# Patient Record
Sex: FEMALE | Race: WHITE | Employment: FULL TIME | URBAN - METROPOLITAN AREA
[De-identification: names, ages, dates, MRNs, and addresses within clinical notes are randomized per-mention and may not be internally consistent; named-entity substitution may affect disease eponyms.]

---

## 2017-03-13 ENCOUNTER — OFFICE VISIT (OUTPATIENT)
Dept: FAMILY MEDICINE CLINIC | Age: 42
End: 2017-03-13

## 2017-03-13 VITALS
SYSTOLIC BLOOD PRESSURE: 110 MMHG | OXYGEN SATURATION: 98 % | WEIGHT: 216.8 LBS | TEMPERATURE: 97.8 F | DIASTOLIC BLOOD PRESSURE: 70 MMHG | HEART RATE: 81 BPM | BODY MASS INDEX: 34.99 KG/M2

## 2017-03-13 DIAGNOSIS — J40 BRONCHITIS: Primary | ICD-10-CM

## 2017-03-13 DIAGNOSIS — N30.01 ACUTE CYSTITIS WITH HEMATURIA: ICD-10-CM

## 2017-03-13 LAB
BILIRUBIN, POC: ABNORMAL
BLOOD URINE, POC: ABNORMAL
CLARITY, POC: ABNORMAL
COLOR, POC: ABNORMAL
GLUCOSE URINE, POC: ABNORMAL
KETONES, POC: ABNORMAL
LEUKOCYTE EST, POC: ABNORMAL
NITRITE, POC: ABNORMAL
PH, POC: 5.5
PROTEIN, POC: ABNORMAL
SPECIFIC GRAVITY, POC: >=1.03
UROBILINOGEN, POC: 0.2

## 2017-03-13 PROCEDURE — 99213 OFFICE O/P EST LOW 20 MIN: CPT | Performed by: FAMILY MEDICINE

## 2017-03-13 PROCEDURE — 81003 URINALYSIS AUTO W/O SCOPE: CPT | Performed by: FAMILY MEDICINE

## 2017-03-13 RX ORDER — LEVOFLOXACIN 500 MG/1
500 TABLET, FILM COATED ORAL DAILY
Qty: 10 TABLET | Refills: 0 | Status: SHIPPED | OUTPATIENT
Start: 2017-03-13 | End: 2017-03-23

## 2017-08-10 ENCOUNTER — TELEPHONE (OUTPATIENT)
Dept: FAMILY MEDICINE CLINIC | Age: 42
End: 2017-08-10

## 2018-01-19 ENCOUNTER — OFFICE VISIT (OUTPATIENT)
Dept: FAMILY MEDICINE CLINIC | Age: 43
End: 2018-01-19

## 2018-01-19 VITALS
HEART RATE: 70 BPM | HEIGHT: 66 IN | DIASTOLIC BLOOD PRESSURE: 62 MMHG | TEMPERATURE: 98.1 F | OXYGEN SATURATION: 98 % | WEIGHT: 196.4 LBS | SYSTOLIC BLOOD PRESSURE: 100 MMHG | BODY MASS INDEX: 31.57 KG/M2

## 2018-01-19 DIAGNOSIS — Z12.31 SCREENING MAMMOGRAM, ENCOUNTER FOR: ICD-10-CM

## 2018-01-19 DIAGNOSIS — R53.83 FATIGUE, UNSPECIFIED TYPE: ICD-10-CM

## 2018-01-19 DIAGNOSIS — F31.61 BIPOLAR DISORDER, CURRENT EPISODE MIXED, MILD (HCC): Primary | ICD-10-CM

## 2018-01-19 DIAGNOSIS — J45.20 MILD INTERMITTENT ASTHMA WITHOUT COMPLICATION: ICD-10-CM

## 2018-01-19 LAB
ALT SERPL-CCNC: 20 U/L (ref 10–40)
ANION GAP SERPL CALCULATED.3IONS-SCNC: 13 MMOL/L (ref 3–16)
BASOPHILS ABSOLUTE: 0.1 K/UL (ref 0–0.2)
BASOPHILS RELATIVE PERCENT: 0.8 %
BUN BLDV-MCNC: 16 MG/DL (ref 7–20)
CALCIUM SERPL-MCNC: 9.4 MG/DL (ref 8.3–10.6)
CHLORIDE BLD-SCNC: 104 MMOL/L (ref 99–110)
CO2: 25 MMOL/L (ref 21–32)
CREAT SERPL-MCNC: 0.7 MG/DL (ref 0.6–1.1)
EOSINOPHILS ABSOLUTE: 0.1 K/UL (ref 0–0.6)
EOSINOPHILS RELATIVE PERCENT: 1.1 %
GFR AFRICAN AMERICAN: >60
GFR NON-AFRICAN AMERICAN: >60
GLUCOSE BLD-MCNC: 82 MG/DL (ref 70–99)
HCT VFR BLD CALC: 38.1 % (ref 36–48)
HEMOGLOBIN: 13 G/DL (ref 12–16)
LYMPHOCYTES ABSOLUTE: 1.7 K/UL (ref 1–5.1)
LYMPHOCYTES RELATIVE PERCENT: 26.5 %
MCH RBC QN AUTO: 29.9 PG (ref 26–34)
MCHC RBC AUTO-ENTMCNC: 34.2 G/DL (ref 31–36)
MCV RBC AUTO: 87.4 FL (ref 80–100)
MONOCYTES ABSOLUTE: 0.5 K/UL (ref 0–1.3)
MONOCYTES RELATIVE PERCENT: 7.6 %
NEUTROPHILS ABSOLUTE: 4 K/UL (ref 1.7–7.7)
NEUTROPHILS RELATIVE PERCENT: 64 %
PDW BLD-RTO: 13 % (ref 12.4–15.4)
PLATELET # BLD: 291 K/UL (ref 135–450)
PMV BLD AUTO: 8.7 FL (ref 5–10.5)
POTASSIUM SERPL-SCNC: 4.7 MMOL/L (ref 3.5–5.1)
RBC # BLD: 4.36 M/UL (ref 4–5.2)
SODIUM BLD-SCNC: 142 MMOL/L (ref 136–145)
TSH REFLEX: 1.13 UIU/ML (ref 0.27–4.2)
WBC # BLD: 6.3 K/UL (ref 4–11)

## 2018-01-19 PROCEDURE — 99214 OFFICE O/P EST MOD 30 MIN: CPT | Performed by: FAMILY MEDICINE

## 2018-01-19 RX ORDER — LITHIUM CARBONATE 300 MG/1
300 CAPSULE ORAL 2 TIMES DAILY WITH MEALS
Qty: 60 CAPSULE | Refills: 3 | Status: SHIPPED | OUTPATIENT
Start: 2018-01-19 | End: 2018-02-03 | Stop reason: DRUGHIGH

## 2018-01-19 RX ORDER — FLUTICASONE FUROATE AND VILANTEROL 100; 25 UG/1; UG/1
1 POWDER RESPIRATORY (INHALATION) DAILY
Qty: 3 EACH | Refills: 0 | COMMUNITY
Start: 2018-01-19 | End: 2019-09-24

## 2018-01-19 ASSESSMENT — PATIENT HEALTH QUESTIONNAIRE - PHQ9
SUM OF ALL RESPONSES TO PHQ9 QUESTIONS 1 & 2: 2
SUM OF ALL RESPONSES TO PHQ QUESTIONS 1-9: 2
1. LITTLE INTEREST OR PLEASURE IN DOING THINGS: 2
2. FEELING DOWN, DEPRESSED OR HOPELESS: 0

## 2018-02-02 ENCOUNTER — OFFICE VISIT (OUTPATIENT)
Dept: FAMILY MEDICINE CLINIC | Age: 43
End: 2018-02-02

## 2018-02-02 VITALS
OXYGEN SATURATION: 100 % | HEART RATE: 69 BPM | SYSTOLIC BLOOD PRESSURE: 106 MMHG | BODY MASS INDEX: 31.93 KG/M2 | DIASTOLIC BLOOD PRESSURE: 60 MMHG | TEMPERATURE: 98.2 F | WEIGHT: 197.8 LBS

## 2018-02-02 DIAGNOSIS — J45.20 MILD INTERMITTENT ASTHMA WITHOUT COMPLICATION: ICD-10-CM

## 2018-02-02 DIAGNOSIS — F31.61 BIPOLAR DISORDER, CURRENT EPISODE MIXED, MILD (HCC): Primary | ICD-10-CM

## 2018-02-02 DIAGNOSIS — Z79.899 MEDICATION MANAGEMENT: ICD-10-CM

## 2018-02-02 LAB
ANION GAP SERPL CALCULATED.3IONS-SCNC: 13 MMOL/L (ref 3–16)
BUN BLDV-MCNC: 18 MG/DL (ref 7–20)
CALCIUM SERPL-MCNC: 9.7 MG/DL (ref 8.3–10.6)
CHLORIDE BLD-SCNC: 103 MMOL/L (ref 99–110)
CO2: 27 MMOL/L (ref 21–32)
CREAT SERPL-MCNC: 0.8 MG/DL (ref 0.6–1.1)
GFR AFRICAN AMERICAN: >60
GFR NON-AFRICAN AMERICAN: >60
GLUCOSE BLD-MCNC: 66 MG/DL (ref 70–99)
LITHIUM DOSE AMOUNT: ABNORMAL
LITHIUM LEVEL: 0.5 MMOL/L (ref 0.6–1.2)
POTASSIUM SERPL-SCNC: 4.5 MMOL/L (ref 3.5–5.1)
SODIUM BLD-SCNC: 143 MMOL/L (ref 136–145)
TSH REFLEX: 1.54 UIU/ML (ref 0.27–4.2)

## 2018-02-02 PROCEDURE — 99213 OFFICE O/P EST LOW 20 MIN: CPT | Performed by: FAMILY MEDICINE

## 2018-02-02 NOTE — PROGRESS NOTES
Subjective:  Bimal Miller is here to discuss the following issues. She has bipolar disorder. We started lithium. She feels significantly improved. She would like to increase the dose if possible. Her mood continues to be somewhat unpredictable. No suicidal or homicidal thoughts. No tremor nausea headache or other side effects. She has asthma and was started her on breo. Her symptoms have improved and she has no current wheezing cough chest tightness or shortness of breath. She has several samples available. No discolored sputum or fevers no vomiting or diarrhea  History   Smoking Status    Never Smoker   Smokeless Tobacco    Never Used   Allergies:     Latex and Other    Objective:  /60   Pulse 69   Temp 98.2 °F (36.8 °C) (Oral)   Wt 197 lb 12.8 oz (89.7 kg)   SpO2 100%   BMI 31.93 kg/m²    No acute distress, heart regular rate and rhythm without murmur, lungs clear to auscultation easy effort, abdomen soft nondistended, no clubbing or cyanosis mood happy affect reactive no tremor    Assessment:  1. Bipolar disorder, current episode mixed, mild ([address]    2. Mild intermittent asthma without complication    3. Medication management            Plan:  Labs ordered and plan on increasing lithium if possible pending results  Continued new inhaler  She would like to try to avoid antidepressants because they caused weight gain in the past  Call with an update in 3 weeks and follow-up in 6 months due to financial concerns and poor insurance coverage   \"Healthy Family Handout\" provided  Avoid exposure to all tobacco products.   Read and consider all information provided by the pharmacy regarding prescribed medications before use  Careful medical compliance  Proper diet and weight management   Otherwise continue current treatment plan  Call or return if symptoms are not well controlled  Go to ED if severe/significant symptoms occur    All medical conditions for this patient are stable unless otherwise

## 2018-02-03 RX ORDER — LITHIUM CARBONATE 300 MG/1
300 CAPSULE ORAL
Qty: 90 CAPSULE | Refills: 1 | Status: SHIPPED | OUTPATIENT
Start: 2018-02-03 | End: 2018-03-07

## 2018-03-07 ENCOUNTER — OFFICE VISIT (OUTPATIENT)
Dept: FAMILY MEDICINE CLINIC | Age: 43
End: 2018-03-07

## 2018-03-07 VITALS
SYSTOLIC BLOOD PRESSURE: 103 MMHG | TEMPERATURE: 97.5 F | OXYGEN SATURATION: 100 % | BODY MASS INDEX: 32.62 KG/M2 | HEART RATE: 74 BPM | WEIGHT: 202.13 LBS | DIASTOLIC BLOOD PRESSURE: 70 MMHG

## 2018-03-07 DIAGNOSIS — R60.0 LOCALIZED EDEMA: ICD-10-CM

## 2018-03-07 DIAGNOSIS — N30.00 ACUTE CYSTITIS WITHOUT HEMATURIA: Primary | ICD-10-CM

## 2018-03-07 LAB
BILIRUBIN, POC: ABNORMAL
BLOOD URINE, POC: ABNORMAL
CLARITY, POC: ABNORMAL
COLOR, POC: ABNORMAL
GLUCOSE URINE, POC: ABNORMAL
KETONES, POC: ABNORMAL
LEUKOCYTE EST, POC: ABNORMAL
NITRITE, POC: POSITIVE
PH, POC: 7.5
PROTEIN, POC: ABNORMAL
SPECIFIC GRAVITY, POC: 1.02
UROBILINOGEN, POC: 0.2

## 2018-03-07 PROCEDURE — 81003 URINALYSIS AUTO W/O SCOPE: CPT | Performed by: FAMILY MEDICINE

## 2018-03-07 PROCEDURE — 99213 OFFICE O/P EST LOW 20 MIN: CPT | Performed by: FAMILY MEDICINE

## 2018-03-07 RX ORDER — NITROFURANTOIN 25; 75 MG/1; MG/1
100 CAPSULE ORAL 2 TIMES DAILY
Qty: 14 CAPSULE | Refills: 0 | Status: SHIPPED | OUTPATIENT
Start: 2018-03-07 | End: 2018-03-14

## 2018-03-07 RX ORDER — FUROSEMIDE 20 MG/1
20 TABLET ORAL DAILY
Qty: 20 TABLET | Refills: 0 | Status: SHIPPED | OUTPATIENT
Start: 2018-03-07 | End: 2018-10-26

## 2018-03-09 LAB
ORGANISM: ABNORMAL
URINE CULTURE, ROUTINE: ABNORMAL
URINE CULTURE, ROUTINE: ABNORMAL

## 2018-04-11 ENCOUNTER — TELEPHONE (OUTPATIENT)
Dept: FAMILY MEDICINE CLINIC | Age: 43
End: 2018-04-11

## 2018-04-16 ENCOUNTER — TELEPHONE (OUTPATIENT)
Dept: FAMILY MEDICINE CLINIC | Age: 43
End: 2018-04-16

## 2018-04-16 DIAGNOSIS — E07.9 THYROID LESION: Primary | ICD-10-CM

## 2018-04-18 ENCOUNTER — HOSPITAL ENCOUNTER (OUTPATIENT)
Dept: ULTRASOUND IMAGING | Age: 43
Discharge: HOME | End: 2018-04-18
Attending: FAMILY MEDICINE | Admitting: FAMILY MEDICINE

## 2018-04-18 DIAGNOSIS — E07.89 OTHER SPECIFIED DISORDERS OF THYROID: ICD-10-CM

## 2018-04-18 DIAGNOSIS — E07.9 THYROID LESION: ICD-10-CM

## 2018-04-25 ENCOUNTER — TELEPHONE (OUTPATIENT)
Dept: FAMILY MEDICINE CLINIC | Age: 43
End: 2018-04-25

## 2018-04-25 DIAGNOSIS — Z86.018 HISTORY OF PITUITARY ADENOMA: ICD-10-CM

## 2018-04-25 DIAGNOSIS — R53.83 FATIGUE, UNSPECIFIED TYPE: Primary | ICD-10-CM

## 2018-04-25 DIAGNOSIS — L65.9 HAIR LOSS: ICD-10-CM

## 2018-07-02 ENCOUNTER — OFFICE VISIT (OUTPATIENT)
Dept: ORTHOPEDIC SURGERY | Age: 43
End: 2018-07-02

## 2018-07-02 DIAGNOSIS — S52.551A OTHER CLOSED EXTRA-ARTICULAR FRACTURE OF DISTAL END OF RIGHT RADIUS, INITIAL ENCOUNTER: ICD-10-CM

## 2018-07-02 DIAGNOSIS — M25.531 RIGHT WRIST PAIN: Primary | ICD-10-CM

## 2018-07-02 PROBLEM — S52.501A CLOSED FRACTURE OF RIGHT DISTAL RADIUS: Status: ACTIVE | Noted: 2018-07-02

## 2018-07-02 PROCEDURE — 99203 OFFICE O/P NEW LOW 30 MIN: CPT | Performed by: ORTHOPAEDIC SURGERY

## 2018-07-02 NOTE — PROGRESS NOTES
fracture    Office Procedures:    Orders Placed This Encounter   Procedures    XR WRIST RIGHT (MIN 3 VIEWS)

## 2018-07-09 ENCOUNTER — OFFICE VISIT (OUTPATIENT)
Dept: ORTHOPEDIC SURGERY | Age: 43
End: 2018-07-09

## 2018-07-09 VITALS — BODY MASS INDEX: 32.6 KG/M2 | WEIGHT: 202 LBS

## 2018-07-09 DIAGNOSIS — S52.551S OTHER CLOSED EXTRA-ARTICULAR FRACTURE OF DISTAL END OF RIGHT RADIUS, SEQUELA: Primary | ICD-10-CM

## 2018-07-09 DIAGNOSIS — M79.641 RIGHT HAND PAIN: ICD-10-CM

## 2018-07-09 PROCEDURE — 99213 OFFICE O/P EST LOW 20 MIN: CPT | Performed by: ORTHOPAEDIC SURGERY

## 2018-07-09 PROCEDURE — 29075 APPL CST ELBW FNGR SHORT ARM: CPT | Performed by: ORTHOPAEDIC SURGERY

## 2018-07-30 ENCOUNTER — OFFICE VISIT (OUTPATIENT)
Dept: ORTHOPEDIC SURGERY | Age: 43
End: 2018-07-30

## 2018-07-30 DIAGNOSIS — S52.551D OTHER CLOSED EXTRA-ARTICULAR FRACTURE OF DISTAL END OF RIGHT RADIUS WITH ROUTINE HEALING, SUBSEQUENT ENCOUNTER: Primary | ICD-10-CM

## 2018-07-30 PROCEDURE — 99213 OFFICE O/P EST LOW 20 MIN: CPT | Performed by: ORTHOPAEDIC SURGERY

## 2018-07-30 PROCEDURE — L3908 WHO COCK-UP NONMOLDE PRE OTS: HCPCS | Performed by: ORTHOPAEDIC SURGERY

## 2018-07-30 NOTE — PROGRESS NOTES
Referral Priority:   Routine     Referral Type:   Eval and Treat     Referral Reason:   Specialty Services Required     Requested Specialty:   Occupational Therapy     Number of Visits Requested:   1    Elizabeth Francois Titan Wrist Short Brace     Patient was prescribed a Elizabeth Francois Titan Wrist Orthosis. The right wrist will require stabilization / immobilization from this semi-rigid / rigid orthosis to improve their function. The orthosis will assist in protecting the affected area, provide functional support and facilitate healing. The patient was educated and fit by a healthcare professional with expert knowledge and specialization in brace application while under the direct supervision of the treating physician. Verbal and written instructions for the use of and application of this item were provided. They were instructed to contact the office immediately should the brace result in increased pain, decreased sensation, increased swelling or worsening of the condition.

## 2018-07-31 ENCOUNTER — OFFICE VISIT (OUTPATIENT)
Dept: FAMILY MEDICINE CLINIC | Age: 43
End: 2018-07-31

## 2018-07-31 VITALS
DIASTOLIC BLOOD PRESSURE: 67 MMHG | WEIGHT: 208 LBS | TEMPERATURE: 98.5 F | HEART RATE: 67 BPM | SYSTOLIC BLOOD PRESSURE: 106 MMHG | OXYGEN SATURATION: 99 % | BODY MASS INDEX: 33.57 KG/M2

## 2018-07-31 DIAGNOSIS — N30.01 ACUTE CYSTITIS WITH HEMATURIA: Primary | ICD-10-CM

## 2018-07-31 DIAGNOSIS — R30.0 DYSURIA: ICD-10-CM

## 2018-07-31 LAB
BILIRUBIN, POC: ABNORMAL
BLOOD URINE, POC: ABNORMAL
CLARITY, POC: ABNORMAL
COLOR, POC: ABNORMAL
GLUCOSE URINE, POC: ABNORMAL
KETONES, POC: ABNORMAL
LEUKOCYTE EST, POC: ABNORMAL
NITRITE, POC: ABNORMAL
PH, POC: 7
PROTEIN, POC: ABNORMAL
SPECIFIC GRAVITY, POC: 1.02
UROBILINOGEN, POC: 0.2

## 2018-07-31 PROCEDURE — 99213 OFFICE O/P EST LOW 20 MIN: CPT | Performed by: NURSE PRACTITIONER

## 2018-07-31 PROCEDURE — 81003 URINALYSIS AUTO W/O SCOPE: CPT | Performed by: NURSE PRACTITIONER

## 2018-07-31 RX ORDER — NITROFURANTOIN 25; 75 MG/1; MG/1
100 CAPSULE ORAL 2 TIMES DAILY
Qty: 14 CAPSULE | Refills: 0 | Status: SHIPPED | OUTPATIENT
Start: 2018-07-31 | End: 2018-08-07

## 2018-07-31 ASSESSMENT — ENCOUNTER SYMPTOMS
ABDOMINAL PAIN: 0
BACK PAIN: 1
NAUSEA: 1
SHORTNESS OF BREATH: 0
VOMITING: 0

## 2018-07-31 NOTE — PATIENT INSTRUCTIONS
products that have deodorants. · After going to the bathroom, wipe from front to back. When should you call for help? Call your doctor now or seek immediate medical care if:    · Symptoms such as fever, chills, nausea, or vomiting get worse or appear for the first time.     · You have new pain in your back just below your rib cage. This is called flank pain.     · There is new blood or pus in your urine.     · You have any problems with your antibiotic medicine.    Watch closely for changes in your health, and be sure to contact your doctor if:    · You are not getting better after taking an antibiotic for 2 days.     · Your symptoms go away but then come back. Where can you learn more? Go to [email or web address] and sign in to your [employer] account. Enter D192 in the [employer]arm box to learn more about \"Urinary Tract Infection in Women: Care Instructions. \"     If you do not have an account, please click on the \"Sign Up Now\" link. Current as of: May 12, 2017  Content Version: 11.6  © 4872-7645 [employer]. Care instructions adapted under license by [employer]. If you have questions about a medical condition or this instruction, always ask your healthcare professional. [place] [address] any warranty or liability for your use of this information.

## 2018-08-03 LAB
ORGANISM: ABNORMAL
ORGANISM: ABNORMAL
URINE CULTURE, ROUTINE: ABNORMAL

## 2018-08-09 ENCOUNTER — HOSPITAL ENCOUNTER (OUTPATIENT)
Dept: OCCUPATIONAL THERAPY | Age: 43
Setting detail: THERAPIES SERIES
Discharge: HOME | End: 2018-08-09
Payer: COMMERCIAL

## 2018-08-09 PROCEDURE — 97110 THERAPEUTIC EXERCISES: CPT | Performed by: OCCUPATIONAL THERAPIST

## 2018-08-09 PROCEDURE — 97018 PARAFFIN BATH THERAPY: CPT | Performed by: OCCUPATIONAL THERAPIST

## 2018-08-09 NOTE — FLOWSHEET NOTE
Objective Measures:        Digits: tips to DP[facility]   0cm IF-RF  0.5cm SF  2.5cm thumb       Wrist ext/flex            rd/ud   40/30 30/22      FA sup/pron   55/43 30/30      Modalities:        Fluidotherapy 12'       MHP + para  10'              Therapeutic Exercise, Activities, NMR:        AROM 10x2 each wrist ext/flex, rd/ud (hand towards ceiling to perform exercises in single plane - decreased pain)    10x2 FA rotation          Activities Page turning x 15 for FA supination                                 Therapeutic Exercise and NMR:  [x] (76277) Provided verbal/tactile cueing for activities related to strengthening, flexibility, endurance, ROM  for improvements in scapular, scapulothoracic and UE control with self care, reaching, carrying, lifting, house/yardwork, driving/computer work.    [] (42688) Provided verbal/tactile cueing for activities related to improving balance, coordination, kinesthetic sense, posture, motor skill, proprioception  to assist with  scapular, scapulothoracic and UE control with self care, reaching, carrying, lifting, house/yardwork, driving/computer work.   [] Comments:    Therapeutic Activities:    [x] (10473 or 21005) Provided verbal/tactile cueing for activities related to improving balance, coordination, kinesthetic sense, posture, motor skill, proprioception and motor activation to allow for proper function of scapular, scapulothoracic and UE control with self care, carrying, lifting, driving/computer work  [] Comments:    Home Exercise Program:    [x] (03799) Reviewed/Progressed HEP activities related to strengthening, flexibility, endurance, ROM of scapular, scapulothoracic and UE control with self care, reaching, carrying, lifting, house/yardwork, driving/computer work  [] (49422) Reviewed/Progressed HEP activities related to improving balance, coordination, kinesthetic sense, posture, motor skill, proprioception of scapular, scapulothoracic and UE control with self care, be achieved in 6  weeks, including patient directed goals to address identified performance deficits:  1) Pt to be independent in graded HEP progression with a good level of effort and compliance. 2) Pt to report a score of 30 % or less on the Quick DASH disability questionnaire for increased performance with carrying, moving, and handling objects. 3) Pt will demonstrate increased ROM to R wrist ext/flex >/= 50 degrees each, FA sup/pron >/= 60 degrees for improved independence with lifting and carrying work related tasks (inspecting glass). 4) Pt will demonstrate increased strength to R /pinch >/= 50% of L for improved independence with opening jars. 5) Pt will have a decrease in pain to 0-1/10 to facilitate improvement in return to recreational tasks (sewing, gardening). 6)    7)      Progression Towards Functional goals:  [] Patient is progressing as expected towards functional goals listed. [] Progression is slowed due to complexities listed. [] Progression has been slowed due to co-morbidities.   [x] Plan just implemented, too soon to assess goals progression  [] All goals are met  [] Other:     ASSESSMENT:  See eval    Treatment/Activity Tolerance:  [x] Patient tolerated treatment well [] Patient limited by fatique  [] Patient limited by pain  [] Patient limited by other medical complications  [] Other:     Prognosis: [x] Good [] Fair  [] Poor    Patient Requires Follow-up: [x] Yes  [] No    PLAN:   [x] Continue per plan of care [x] Alter current plan (see comments) Progress toward strengthening as able  [] Plan of care initiated [] Hold pending MD visit [] Discharge    Electronically signed by: Faisal Mccarthy OTR/WENDY, [address] [place] [address]

## 2018-08-13 ENCOUNTER — HOSPITAL ENCOUNTER (OUTPATIENT)
Dept: OCCUPATIONAL THERAPY | Age: 43
Setting detail: THERAPIES SERIES
Discharge: HOME | End: 2018-08-13
Payer: COMMERCIAL

## 2018-08-13 PROCEDURE — 97018 PARAFFIN BATH THERAPY: CPT | Performed by: OCCUPATIONAL THERAPIST

## 2018-08-13 PROCEDURE — 97110 THERAPEUTIC EXERCISES: CPT | Performed by: OCCUPATIONAL THERAPIST

## 2018-08-13 NOTE — FLOWSHEET NOTE
[address] [place], [address]                  Hand Therapy Daily Treatment Note  Date:  2018    Patient: Norm Champion   : 1975   MRN: [id number]  Referring Physician: Referring Practitioner: Davina Cowart MD       Medical Diagnosis Information:  Diagnosis: S52.551D (ICD-10-CM) - Other closed extra-articular fracture of distal end of right radius with routine healing    Treatment Diagnosis: R wrist stiffness - M25.631                                         Insurance information: OT Insurance Information: WC  Date of Injury: 18  Date of Surgery: NA     Visit # Insurance Allowable   3 12 visits approved thru 18     Date of Patient follow up with Physician: 18    G-Codes:  OT G-codes  Functional Assessment Tool Used: Quick DASH  Score: 55%  Functional Limitation: Carrying, moving and handling objects  Carrying, Moving and Handling Objects Current Status (): At least 40 percent but less than 60 percent impaired, limited or restricted  Carrying, Moving and Handling Objects Goal Status (): At least 20 percent but less than 40 percent impaired, limited or restricted    Progress Note: []  Yes  [x]  No  Next due by: Visit #10      Precautions/ Contra-indications: fx protocol  Latex Allergy:  []No      [x]Yes                 Pacemaker:  [x] No       [] Yes   **allergic to band-aid adhesives, latex, styrofoam, chapstick     Preferred Language for Healthcare:   [x]English       []other:    Pain level:  2/10     SUBJECTIVE: Splint wear at work only. HEP 2-3x/day. 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  Some symptoms like tightness remain, but are not a functional limitation    Background/Relevant Medical & Therapy History: fell onto R wrist on 18 while trying to prevent a shelf from falling at work; casted x 6 scapular, scapulothoracic and UE control with self care, reaching, carrying, lifting, house/yardwork, driving/computer work    [x] Comments: see sheet. Added composite wrist/digit rolling, gentle PROM to wrist ext as tolerated. Prayer stretch or tabletop stretch for extension    Manual Treatments:  PROM / STM / Oscillations-Mobs:  G-I, II, III, IV (PA's, Inf., Post.)  [x] (64901) Provided manual therapy to mobilize soft tissue/joints of cervical/CT, scapular GHJ and UE for the purpose of modulating pain, promoting relaxation,  increasing ROM, reducing/eliminating soft tissue swelling/inflammation/restriction, improving soft tissue extensibility and allowing for proper ROM for normal function with self care, reaching, carrying, lifting, house/yardwork, driving/computer work  [x] Comments: gentle AAROM, issued compression sleeve for edema control/wrist support    ADL Training:  []  (63343) Provided self-care/home management training related to activities of daily living and compensatory training, and/or use of adaptive equipment   [] Comments:      Splinting:  [] Fabrication of:   [] (91812) Orthotic/Prosthetic Management, subsequent encounter  [] (68175) Orthotic management and training (fitting and assessment)  [] Comments:     Charges:  Timed Code Treatment Minutes: 40   Total Treatment Minutes: 50     [] EVAL (LOW) 42944   [] OT Re-eval (56421)  [] EVAL (MOD) 35348   [] EVAL (HIGH) 72107       [x] Sin (56343) x  3   [] RMUOP(95681)  [] NMR (95440) x      [] Estim (attended) (26647)   [] Manual ([id number]) x       [] US (34134)  [] TA (57550) x      [x] Paraffin (77245)  [] ADL  (74226) x     [] Splint/L code:    [] Estim (unattended) (22 [id number])  [] Fluidotherapy (34908)  [] Other:    GOALS:  Short Term Goals: To be achieved in: 2 weeks  1. Independent in HEP and progression per patient tolerance, in order to prevent re-injury.    2. Patient will have a decrease in pain to facilitate improvement in movement, function, and

## 2018-08-15 ENCOUNTER — HOSPITAL ENCOUNTER (OUTPATIENT)
Dept: OCCUPATIONAL THERAPY | Age: 43
Setting detail: THERAPIES SERIES
Discharge: HOME | End: 2018-08-15
Payer: COMMERCIAL

## 2018-08-15 PROCEDURE — 97110 THERAPEUTIC EXERCISES: CPT | Performed by: OCCUPATIONAL THERAPIST

## 2018-08-15 PROCEDURE — 97018 PARAFFIN BATH THERAPY: CPT | Performed by: OCCUPATIONAL THERAPIST

## 2018-08-15 NOTE — FLOWSHEET NOTE
onto R wrist on 6/22/18 while trying to prevent a shelf from falling at work; casted x 6 weeks, then transitioned to wrist brace; return to MD on 8/27/18     RESTRICTIONS/PRECAUTIONS: fx protocol    OBJECTIVE:       Date: 8/7/18 8/9/18 8/13/18 8/15/18    Objective Measures: BRAYAN       Digits: tips to [facility]          Wrist ext/flex            rd/ud    30/22 40/30     FA sup/pron    30/30 50/35      #2   R21 L80 R33    Modalities:        Fluidotherapy        MHP + para  10' 10' 10'            Therapeutic Exercise, Activities, NMR:        AROM        Activities        Prayer stretch   x10 x10    Flexbar   Red x20 Red x20    Pilates ring   Pulse x20 Pulse x20    Dowel in putty   6' 6'      Therapeutic Exercise and NMR:  [x] (52115) Provided verbal/tactile cueing for activities related to strengthening, flexibility, endurance, ROM  for improvements in scapular, scapulothoracic and UE control with self care, reaching, carrying, lifting, house/yardwork, driving/computer work.    [] (75497) Provided verbal/tactile cueing for activities related to improving balance, coordination, kinesthetic sense, posture, motor skill, proprioception  to assist with  scapular, scapulothoracic and UE control with self care, reaching, carrying, lifting, house/yardwork, driving/computer work.   [] Comments:    Therapeutic Activities:    [x] (25921 or 09451) Provided verbal/tactile cueing for activities related to improving balance, coordination, kinesthetic sense, posture, motor skill, proprioception and motor activation to allow for proper function of scapular, scapulothoracic and UE control with self care, carrying, lifting, driving/computer work  [] Comments:    Home Exercise Program:    [x] (22539) Reviewed/Progressed HEP activities related to strengthening, flexibility, endurance, ROM of scapular, scapulothoracic and UE control with self care, reaching, carrying, lifting, house/yardwork, driving/computer work  [] (04107) decrease in pain to facilitate improvement in movement, function, and ADLs as indicated by Functional Deficits. Long Term Goals to be achieved in 6  weeks, including patient directed goals to address identified performance deficits:  1) Pt to be independent in graded HEP progression with a good level of effort and compliance. 2) Pt to report a score of 30 % or less on the Quick DASH disability questionnaire for increased performance with carrying, moving, and handling objects. 3) Pt will demonstrate increased ROM to R wrist ext/flex >/= 50 degrees each, FA sup/pron >/= 60 degrees for improved independence with lifting and carrying work related tasks (inspecting glass). 4) Pt will demonstrate increased strength to R /pinch >/= 50% of L for improved independence with opening jars. 5) Pt will have a decrease in pain to 0-1/10 to facilitate improvement in return to recreational tasks (sewing, gardening). Progression Towards Functional goals:  [x] Patient is progressing as expected towards functional goals listed. [] Progression is slowed due to complexities listed. [] Progression has been slowed due to co-morbidities. [x] Plan just implemented, too soon to assess goals progression  [] All goals are met  [] Other:     ASSESSMENT:  Good strength increase within the week.  Tolerated initial strengthening well    Treatment/Activity Tolerance:  [x] Patient tolerated treatment well [] Patient limited by fatique  [] Patient limited by pain  [] Patient limited by other medical complications  [] Other:     Prognosis: [x] Good [] Fair  [] Poor    Patient Requires Follow-up: [x] Yes  [] No    PLAN:   [x] Continue per plan of care [x] Alter current plan (see comments) Progress strengthening as able  [] Plan of care initiated [] Hold pending MD visit [] Discharge    Electronically signed by: Alfredo Vasquez OTR/L, [facility]

## 2018-08-20 ENCOUNTER — HOSPITAL ENCOUNTER (OUTPATIENT)
Dept: OCCUPATIONAL THERAPY | Age: 43
Setting detail: THERAPIES SERIES
Discharge: HOME | End: 2018-08-20
Payer: COMMERCIAL

## 2018-08-20 PROCEDURE — 97110 THERAPEUTIC EXERCISES: CPT | Performed by: OCCUPATIONAL THERAPIST

## 2018-08-20 PROCEDURE — 97018 PARAFFIN BATH THERAPY: CPT | Performed by: OCCUPATIONAL THERAPIST

## 2018-08-20 NOTE — FLOWSHEET NOTE
Therapy History: fell onto R wrist on 6/22/18 while trying to prevent a shelf from falling at work; casted x 6 weeks, then transitioned to wrist brace; return to MD on 8/27/18     RESTRICTIONS/PRECAUTIONS: fx protocol    OBJECTIVE:       Date: 8/7/18 8/9/18 8/13/18 8/15/18 8/20/18   Objective Measures: BRAYAN       Digits: tips to [facility]          Wrist ext/flex            rd/ud    30/22 40/30  52/35   FA sup/pron    30/30 50/35  60/45    #2   R21 L80 R33 R35   Modalities:        Fluidotherapy        MHP + para  10' 10' 10' 10'           Therapeutic Exercise, Activities, NMR:        AROM        Activities        Prayer stretch   x10 x10    Flexbar   Red x20 Red x20 Red x20 flex/ext   Pilates ring   Pulse x20 Pulse x20 Pulse x20   Powerweb     Grab and twist x20   Dowel in putty   6' 6' 7'     Therapeutic Exercise and NMR:  [x] (81633) Provided verbal/tactile cueing for activities related to strengthening, flexibility, endurance, ROM  for improvements in scapular, scapulothoracic and UE control with self care, reaching, carrying, lifting, house/yardwork, driving/computer work.    [] (01706) Provided verbal/tactile cueing for activities related to improving balance, coordination, kinesthetic sense, posture, motor skill, proprioception  to assist with  scapular, scapulothoracic and UE control with self care, reaching, carrying, lifting, house/yardwork, driving/computer work.   [] Comments:    Therapeutic Activities:    [x] (19652 or 38215) Provided verbal/tactile cueing for activities related to improving balance, coordination, kinesthetic sense, posture, motor skill, proprioception and motor activation to allow for proper function of scapular, scapulothoracic and UE control with self care, carrying, lifting, driving/computer work  [] Comments:    Home Exercise Program:    [x] (65325) Reviewed/Progressed HEP activities related to strengthening, flexibility, endurance, ROM of scapular, scapulothoracic and UE control with self care, reaching, carrying, lifting, house/yardwork, driving/computer work  [] (74148) Reviewed/Progressed HEP activities related to improving balance, coordination, kinesthetic sense, posture, motor skill, proprioception of scapular, scapulothoracic and UE control with self care, reaching, carrying, lifting, house/yardwork, driving/computer work    [x] Comments: see sheet. Added composite wrist/digit rolling, gentle PROM to wrist ext as tolerated. Prayer stretch or tabletop stretch for extension    Manual Treatments:  PROM / STM / Oscillations-Mobs:  G-I, II, III, IV (PA's, Inf., Post.)  [x] (82645) Provided manual therapy to mobilize soft tissue/joints of cervical/CT, scapular GHJ and UE for the purpose of modulating pain, promoting relaxation,  increasing ROM, reducing/eliminating soft tissue swelling/inflammation/restriction, improving soft tissue extensibility and allowing for proper ROM for normal function with self care, reaching, carrying, lifting, house/yardwork, driving/computer work  [] Comments:     ADL Training:  []  (02877) Provided self-care/home management training related to activities of daily living and compensatory training, and/or use of adaptive equipment   [] Comments:      Splinting:  [] Fabrication of:   [] (48312) Orthotic/Prosthetic Management, subsequent encounter  [] (65602) Orthotic management and training (fitting and assessment)  [] Comments:     Charges:  Timed Code Treatment Minutes: 40   Total Treatment Minutes: 50     [] EVAL (LOW) 47266   [] OT Re-eval (34127)  [] EVAL (MOD) 86609   [] EVAL (HIGH) 99826       [x] Sin (26921) x  3   [] YYGTC(67358)  [] NMR (46356) x      [] Estim (attended) (00936)   [] Manual ([id number]) x       [] US (96772)  [] TA (06376) x      [x] Paraffin (24466)  [] ADL  (96860) x     [] Splint/L code:    [] Estim (unattended) (22 159686)  [] Fluidotherapy (45532)  [] Other:    GOALS:  Short Term Goals: To be achieved in: 2 weeks  1.  Independent in HEP and

## 2018-08-22 ENCOUNTER — HOSPITAL ENCOUNTER (OUTPATIENT)
Dept: OCCUPATIONAL THERAPY | Age: 43
Setting detail: THERAPIES SERIES
Discharge: HOME | End: 2018-08-22
Payer: COMMERCIAL

## 2018-08-22 PROCEDURE — G8985 CARRY GOAL STATUS: HCPCS | Performed by: OCCUPATIONAL THERAPIST

## 2018-08-22 PROCEDURE — 97110 THERAPEUTIC EXERCISES: CPT | Performed by: OCCUPATIONAL THERAPIST

## 2018-08-22 PROCEDURE — G8984 CARRY CURRENT STATUS: HCPCS | Performed by: OCCUPATIONAL THERAPIST

## 2018-08-22 NOTE — FLOWSHEET NOTE
Therapy History: fell onto R wrist on 6/22/18 while trying to prevent a shelf from falling at work; casted x 6 weeks, then transitioned to wrist brace; return to MD on 8/27/18     RESTRICTIONS/PRECAUTIONS: fx protocol    OBJECTIVE:       Date: 8/7/18 8/9/18 8/13/18 8/15/18 8/20/18 8/22/18   Objective Measures: BRAYAN        Digits: tips to [facility]           Wrist ext/flex            rd/ud    30/22 40/30  52/35    FA sup/pron    30/30 50/35  60/45     #2   R21 L80 R33 R35 R37   Modalities:         Fluidotherapy         MHP + para  10' 10' 10' 10'             Therapeutic Exercise, Activities, NMR:         AROM         Activities         Prayer stretch   x10 x10  x10   Flexbar   Red x20 Red x20 Red x20 flex/ext Red x15 flex/ext   Pilates ring   Pulse x20 Pulse x20 Pulse x20 Pulse x20   Foam piece       Gripper #2   Powerweb     Grab and twist x20 Grab and twist x20   Dowel in putty   6' 6' 7' 7'     Therapeutic Exercise and NMR:  [x] (70131) Provided verbal/tactile cueing for activities related to strengthening, flexibility, endurance, ROM  for improvements in scapular, scapulothoracic and UE control with self care, reaching, carrying, lifting, house/yardwork, driving/computer work.    [] (37340) Provided verbal/tactile cueing for activities related to improving balance, coordination, kinesthetic sense, posture, motor skill, proprioception  to assist with  scapular, scapulothoracic and UE control with self care, reaching, carrying, lifting, house/yardwork, driving/computer work.   [] Comments:    Therapeutic Activities:    [x] (68572 or 32456) Provided verbal/tactile cueing for activities related to improving balance, coordination, kinesthetic sense, posture, motor skill, proprioception and motor activation to allow for proper function of scapular, scapulothoracic and UE control with self care, carrying, lifting, driving/computer work  [] Comments:    Home Exercise Program:    [x] (19167) Reviewed/Progressed HEP activities related to strengthening, flexibility, endurance, ROM of scapular, scapulothoracic and UE control with self care, reaching, carrying, lifting, house/yardwork, driving/computer work  [] (71749) Reviewed/Progressed HEP activities related to improving balance, coordination, kinesthetic sense, posture, motor skill, proprioception of scapular, scapulothoracic and UE control with self care, reaching, carrying, lifting, house/yardwork, driving/computer work    [x] Comments: see sheet. Added composite wrist/digit rolling, gentle PROM to wrist ext as tolerated.  Prayer stretch or tabletop stretch for extension    Manual Treatments:  PROM / STM / Oscillations-Mobs:  G-I, II, III, IV (PA's, Inf., Post.)  [x] (29795) Provided manual therapy to mobilize soft tissue/joints of cervical/CT, scapular GHJ and UE for the purpose of modulating pain, promoting relaxation,  increasing ROM, reducing/eliminating soft tissue swelling/inflammation/restriction, improving soft tissue extensibility and allowing for proper ROM for normal function with self care, reaching, carrying, lifting, house/yardwork, driving/computer work  [] Comments:     ADL Training:  []  (04539) Provided self-care/home management training related to activities of daily living and compensatory training, and/or use of adaptive equipment   [] Comments:      Splinting:  [] Fabrication of:   [] (54030) Orthotic/Prosthetic Management, subsequent encounter  [] (61847) Orthotic management and training (fitting and assessment)  [] Comments:     Charges:  Timed Code Treatment Minutes: 39   Total Treatment Minutes: 39     [] EVAL (LOW) 44626   [] OT Re-eval (33466)  [] EVAL (MOD) 13108   [] EVAL (HIGH) 17654       [x] Sin (54126) x  3   [] OWXJK(93911)  [] NMR (00158) x      [] Estim (attended) (01883)   [] Manual ([id number]) x       [] US (98592)  [] TA (65995) x      [] Paraffin (44603)  [] ADL  (26 [phone]) x     [] Splint/L code:    [] Estim (unattended) (22 397061)  [] pending MD visit [] Discharge    Electronically signed by: Stafford Race OTR/L, [facility]

## 2018-08-22 NOTE — OP NOTE
[address] [place], [address] [place] [address]                               Hand Therapy Team    Date: 8/22/2018  Location: O  Physician: Dr. Rhonda Huston  Patient: Marco A Vyas     Diagnosis: distal radius fx, wrist stiffness  Visits:  6  Dates:  8/7/18 - 8/22/18 Level of compliance: Good  [] Fair  [] Poor     Treatment has consisted of: ROM, strengthening  Patient's primary complaints: stiffness, weakness  Pain report: Resting = 0/10  With Use = 1-2/10  The patient reports being 91% of desired functional status.   Dominant Hand: [x] Right  [] Left     Objective:  (taken pre- or post-tx) Current (R) Current (L) Changes Made   AROM       wrist pre 52/35                                 Strength        #2  37 80                     Functional Status:     Able to perform with no difficulty or pain   [x] Write / keyboard with injured hand  [] Open Containers / jars    [x] Bathe / dress self  [x] Drive and start car    [x] Housework:    [] Light   [x] Heavy  [x] Take change    [] Yard work:      [] Light   [] Heavy  [x] Weight bear on upper extremity    [x] Vocation:     [x]  Light duty   [] Full Duty  [] Gym workout    [] Lifting: Up to x lbs with affected UE  [] Sports     Other (Scar, etc.):  N/A    Assessment:  Progress made in the following areas: ROM, strength  Areas needing additional treatment: Possible continued strength deficits once full duty work restarts    Plan / Recommendations:  [x] Continue OT treatment as ordered  [] Change plan to:     Electronically signed by: Chris LUONG/WENDY, [address] [place] [address]    Physician Recommendations:  [] Follow treatment plan as above [] Discontinue hand therapy  [] Change plan to: _______________________________________________________________    [] BRAYAN ([phone])  [x] EGO ([phone])  [] AND ([phone])          Fax   [phone]                       Fax  [phone]                  Fax  [phone]

## 2018-08-23 ENCOUNTER — OFFICE VISIT (OUTPATIENT)
Dept: ORTHOPEDIC SURGERY | Age: 43
End: 2018-08-23

## 2018-08-23 VITALS — WEIGHT: 200 LBS | HEIGHT: 66 IN | BODY MASS INDEX: 32.14 KG/M2

## 2018-08-23 DIAGNOSIS — S52.551S OTHER CLOSED EXTRA-ARTICULAR FRACTURE OF DISTAL END OF RIGHT RADIUS, SEQUELA: Primary | ICD-10-CM

## 2018-08-23 PROCEDURE — 99213 OFFICE O/P EST LOW 20 MIN: CPT | Performed by: ORTHOPAEDIC SURGERY

## 2018-08-27 NOTE — PROGRESS NOTES
Assessment: Healed right distal radius fracture after fairly severe fracture that was reduced in the emergency room    Treatment Plan: She wishes to return to regular duty at work. I do think she needs to continue in therapy. Return in about 4 weeks (around 9/20/2018) for X-ray next visit. History of Present Illness  Carly Flynn is a 40 y.o. female. Location Right wrist Severity low-grade pain at this point Duration she has been out of her cast for about 4 weeks Modifying factors therapy is definitely improving Associated symptoms no real associated numbness or tingling    Review of Systems  Pertinent items are noted in HPI  Denies fever chills, confusion and bowel and bladder active change  Complete Review of Systems reviewed from patient history form dated 2018 and available in the patients chart under the media tab. Vital Signs  Vitals:    08/23/18 1029   Weight: 200 lb (90.7 kg)   Height: 5' 6\" (1.676 m)     Body mass index is 32.28 kg/m². Medical History  Past Medical History:   Diagnosis Date    Anxiety     Kidney stones     During pregnancy    Migraines     [facility]     [facility] - Stable after 1 year     Current Outpatient Prescriptions on File Prior to Visit   Medication Sig Dispense Refill    naproxen (NAPROSYN) 500 MG tablet Take 1 tablet by mouth 2 times daily (with meals) 30 tablet 0    furosemide (LASIX) 20 MG tablet Take 1 tablet by mouth daily As needed 20 tablet 0    fluticasone-vilanterol (BREO ELLIPTA) 100-25 MCG/INH AEPB inhaler Inhale 1 puff into the lungs daily 3 each 0    albuterol sulfate HFA (PROAIR HFA) 108 (90 BASE) MCG/ACT inhaler Inhale 2 puffs into the lungs every 4 hours as needed 1 Inhaler 3     No current facility-administered medications on file prior to visit.       Allergies   Allergen Reactions    Latex     Other      bandaid adhesive     [unfilled]  Family History   Problem Relation Age of Onset    Heart Disease Father

## 2018-08-29 ENCOUNTER — HOSPITAL ENCOUNTER (OUTPATIENT)
Dept: OCCUPATIONAL THERAPY | Age: 43
Setting detail: THERAPIES SERIES
Discharge: HOME | End: 2018-08-29
Payer: COMMERCIAL

## 2018-08-29 PROCEDURE — 97110 THERAPEUTIC EXERCISES: CPT | Performed by: OCCUPATIONAL THERAPIST

## 2018-08-29 NOTE — FLOWSHEET NOTE
[address] [place], [address]                  Hand Therapy Daily Treatment Note  Date:  2018    Patient: Daniela Dash   : 1975   MRN: [id number]  Referring Physician: Referring Practitioner: Zaid Montalvo MD       Medical Diagnosis Information:  Diagnosis: S52.551D (ICD-10-CM) - Other closed extra-articular fracture of distal end of right radius with routine healing    Treatment Diagnosis: R wrist stiffness - M25.631                                         Insurance information: OT Insurance Information:   Date of Injury: 18  Date of Surgery: NA     Visit # Insurance Allowable   7 12 visits approved thru 18     Date of Patient follow up with Physician: 18    G-Codes:  OT G-codes  Functional Assessment Tool Used: Quick DASH  Score: 9%  Functional Limitation: Carrying, moving and handling objects  Carrying, Moving and Handling Objects Current Status (): At least 1 percent but less than 20 percent impaired, limited or restricted  Carrying, Moving and Handling Objects Goal Status (): At least 1 percent but less than 20 percent impaired, limited or restricted    Progress Note: []  Yes  [x]  No  Next due by: Visit #10      Precautions/ Contra-indications: fx protocol  Latex Allergy:  []No      [x]Yes                 Pacemaker:  [x] No       [] Yes   **allergic to band-aid adhesives, latex, styrofoam, chapstick     Preferred Language for Healthcare:   [x]English       []other:    Pain level:  2/10     SUBJECTIVE:  Remains a little sore due to full duty work now. Splint wear at work only. HEP 2-3x/day. 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  Some symptoms like tightness remain, but are not a functional limitation    Background/Relevant Medical & Therapy History: fell onto R wrist on 18 while trying to Program:    [x] (71140) Reviewed/Progressed HEP activities related to strengthening, flexibility, endurance, ROM of scapular, scapulothoracic and UE control with self care, reaching, carrying, lifting, house/yardwork, driving/computer work  [] (59274) Reviewed/Progressed HEP activities related to improving balance, coordination, kinesthetic sense, posture, motor skill, proprioception of scapular, scapulothoracic and UE control with self care, reaching, carrying, lifting, house/yardwork, driving/computer work    [x] Comments: see sheet. Added composite wrist/digit rolling, gentle PROM to wrist ext as tolerated.  Prayer stretch or tabletop stretch for extension    Manual Treatments:  PROM / STM / Oscillations-Mobs:  G-I, II, III, IV (PA's, Inf., Post.)  [x] (01892) Provided manual therapy to mobilize soft tissue/joints of cervical/CT, scapular GHJ and UE for the purpose of modulating pain, promoting relaxation,  increasing ROM, reducing/eliminating soft tissue swelling/inflammation/restriction, improving soft tissue extensibility and allowing for proper ROM for normal function with self care, reaching, carrying, lifting, house/yardwork, driving/computer work  [] Comments:     ADL Training:  []  (45583) Provided self-care/home management training related to activities of daily living and compensatory training, and/or use of adaptive equipment   [] Comments:      Splinting:  [] Fabrication of:   [] (85111) Orthotic/Prosthetic Management, subsequent encounter  [] (29998) Orthotic management and training (fitting and assessment)  [] Comments:     Charges:  Timed Code Treatment Minutes: 44   Total Treatment Minutes: 44     [] EVAL (LOW) 93396   [] OT Re-eval (94010)  [] EVAL (MOD) 80201   [] EVAL (HIGH) 11363       [x] Sin (97805) x  3   [] AKTHG(22578)  [] NMR (99087) x      [] Estim (attended) (95217)   [] Manual ([id number]) x       [] US (05154)  [] TA (46445) x      [] Paraffin (05509)  [] ADL  (31072) x     [] Splint/L code:    [] Estim (unattended) 33 93 31)  [] Fluidotherapy (44315)  [] Other:    GOALS:  Short Term Goals: To be achieved in: 2 weeks  1. Independent in HEP and progression per patient tolerance, in order to prevent re-injury. 2. Patient will have a decrease in pain to facilitate improvement in movement, function, and ADLs as indicated by Functional Deficits. Long Term Goals to be achieved in 6  weeks, including patient directed goals to address identified performance deficits:  1) Pt to be independent in graded HEP progression with a good level of effort and compliance. MET  2) Pt to report a score of 30% or less on the Quick DASH disability questionnaire for increased performance with carrying, moving, and handling objects. MET - new goal 5%  3) Pt will demonstrate increased ROM to R wrist ext/flex >/= 50 degrees each, FA sup/pron >/= 60 degrees for improved independence with lifting and carrying work related tasks (inspecting glass). MET  4) Pt will demonstrate increased strength to R /pinch >/= 50% of L for improved independence with opening jars. ONGOING  5) Pt will have a decrease in pain to 0-1/10 to facilitate improvement in return to recreational tasks (sewing, gardening). ONGOING      Progression Towards Functional goals:  [x] Patient is progressing as expected towards functional goals listed. [] Progression is slowed due to complexities listed. [] Progression has been slowed due to co-morbidities.   [] Plan just implemented, too soon to assess goals progression  [] All goals are met  [] Other:     ASSESSMENT:  Steady progress with ROM and strength    Treatment/Activity Tolerance:  [x] Patient tolerated treatment well [] Patient limited by fatique  [] Patient limited by pain  [] Patient limited by other medical complications  [] Other:     Prognosis: [x] Good [] Fair  [] Poor    Patient Requires Follow-up: [x] Yes  [] No    PLAN:   [x] Continue per plan of care [x] Alter current plan (see

## 2018-10-22 ENCOUNTER — OFFICE VISIT (OUTPATIENT)
Dept: ORTHOPEDIC SURGERY | Age: 43
End: 2018-10-22
Payer: COMMERCIAL

## 2018-10-22 VITALS — WEIGHT: 199.96 LBS | HEIGHT: 66 IN | BODY MASS INDEX: 32.14 KG/M2

## 2018-10-22 DIAGNOSIS — S52.551S OTHER CLOSED EXTRA-ARTICULAR FRACTURE OF DISTAL END OF RIGHT RADIUS, SEQUELA: Primary | ICD-10-CM

## 2018-10-22 PROCEDURE — 99213 OFFICE O/P EST LOW 20 MIN: CPT | Performed by: ORTHOPAEDIC SURGERY

## 2018-10-26 ENCOUNTER — OFFICE VISIT (OUTPATIENT)
Dept: ENDOCRINOLOGY | Age: 43
End: 2018-10-26
Payer: COMMERCIAL

## 2018-10-26 ENCOUNTER — HOSPITAL ENCOUNTER (OUTPATIENT)
Age: 43
Discharge: HOME | End: 2018-10-26
Payer: COMMERCIAL

## 2018-10-26 VITALS
WEIGHT: 206.2 LBS | DIASTOLIC BLOOD PRESSURE: 70 MMHG | RESPIRATION RATE: 14 BRPM | BODY MASS INDEX: 34.35 KG/M2 | HEIGHT: 65 IN | OXYGEN SATURATION: 98 % | TEMPERATURE: 97.9 F | SYSTOLIC BLOOD PRESSURE: 106 MMHG | HEART RATE: 78 BPM

## 2018-10-26 DIAGNOSIS — D35.2 PITUITARY ADENOMA (HCC): Primary | ICD-10-CM

## 2018-10-26 DIAGNOSIS — R53.82 CHRONIC FATIGUE: ICD-10-CM

## 2018-10-26 DIAGNOSIS — D35.2 PITUITARY ADENOMA (HCC): ICD-10-CM

## 2018-10-26 LAB
CORTISOL - AM: 7.7 UG/DL (ref 4.3–22.4)
ESTRADIOL LEVEL: 79 PG/ML
FOLLICLE STIMULATING HORMONE: 5.9 MIU/ML
LUTEINIZING HORMONE: 12.9 MIU/ML
PROLACTIN: 17.5 NG/ML
T4 FREE: 1 NG/DL (ref 0.9–1.8)
TSH SERPL DL<=0.05 MIU/L-ACNC: 1.1 UIU/ML (ref 0.27–4.2)

## 2018-10-26 PROCEDURE — 84443 ASSAY THYROID STIM HORMONE: CPT

## 2018-10-26 PROCEDURE — 82024 ASSAY OF ACTH: CPT

## 2018-10-26 PROCEDURE — 99243 OFF/OP CNSLTJ NEW/EST LOW 30: CPT | Performed by: INTERNAL MEDICINE

## 2018-10-26 PROCEDURE — 82533 TOTAL CORTISOL: CPT

## 2018-10-26 PROCEDURE — 84146 ASSAY OF PROLACTIN: CPT

## 2018-10-26 PROCEDURE — 82670 ASSAY OF TOTAL ESTRADIOL: CPT

## 2018-10-26 PROCEDURE — 36415 COLL VENOUS BLD VENIPUNCTURE: CPT

## 2018-10-26 PROCEDURE — 84439 ASSAY OF FREE THYROXINE: CPT

## 2018-10-26 PROCEDURE — 83001 ASSAY OF GONADOTROPIN (FSH): CPT

## 2018-10-26 PROCEDURE — 83002 ASSAY OF GONADOTROPIN (LH): CPT

## 2018-10-26 PROCEDURE — 84305 ASSAY OF SOMATOMEDIN: CPT

## 2018-10-26 NOTE — Clinical Note
Dear Dr. Sharath Coello,  Thank you so much for involving me in the care of your patient. Please review the enclosed note.   Warm Regards, Chadd Rolle

## 2018-10-26 NOTE — PROGRESS NOTES
at 4 AM with a headache to the back of her head.  She   describes it as a pressure throbbing headache that is 10 out of 10.  She   states around 8:30 AM she developed feeling like her tongue was thick and   decreased sensation and numbness to the left side of her face, left neck,   left arm.  Unsure if she had any weakness but she did not notice a difficulty   doing her regular activities.  She tried taking Imitrex at 7 AM without   improvement.  She did have increased pain with bending forward.  She was   sensitive to smells.  No alleviating factors.  Had associated nausea. Reports   he has had similar severe headaches before and this is not the worst headache   of her life but she has never had the numbness associated with it and that   was different. Jose Dudley does report a history of TIA/Bell's palsy in 2003.  She   states she had a CT and MRI that showed she had a stroke. Jose Dudley was never   placed on any medications for it but she also had Bell's palsy at the same   time.  No residual deficits.  Patient and family did not note facial drooping. Acuity: Acute       FINDINGS:   INTRACRANIAL STRUCTURES/VENTRICLES: There is no acute infarct.  The   ventricles and cisternal spaces are normal in size, shape, and configuration   for the age of the patient. No areas of abnormal signal are identified in the   brain parenchyma.  There is no midline shift or mass effect. There are no   areas of restricted diffusion.       ORBITS: The visualized portion of the orbits demonstrate no acute abnormality.       SINUSES: There is mucoperiosteal thickening of the ethmoid air cells. The   remainder of the sinuses are clear. The mastoid air cells are clear.       BONES/SOFT TISSUES: The bone marrow signal intensity appears normal. The   craniocervical junction is normal in appearance.           Impression   No acute intracranial abnormality.                     Assessment/Plan      1.  Pituitary adenoma    This 40 yrs old female has h/o

## 2018-10-28 LAB
IGF-1 (INSULIN-LIKE GROWTH I): 149 NG/ML (ref 75–267)
INSULIN-LIKE GROWTH FACTOR-1 Z-SCORE: 0

## 2018-10-30 LAB — ADRENOCORTICOTROPIC HORMONE: 37 PG/ML (ref 6–58)

## 2018-10-31 ENCOUNTER — TELEPHONE (OUTPATIENT)
Dept: ENDOCRINOLOGY | Age: 43
End: 2018-10-31

## 2019-01-07 ENCOUNTER — OFFICE VISIT (OUTPATIENT)
Dept: FAMILY MEDICINE CLINIC | Age: 44
End: 2019-01-07
Payer: COMMERCIAL

## 2019-01-07 VITALS
WEIGHT: 205 LBS | HEART RATE: 70 BPM | DIASTOLIC BLOOD PRESSURE: 71 MMHG | OXYGEN SATURATION: 99 % | TEMPERATURE: 98 F | BODY MASS INDEX: 33.66 KG/M2 | SYSTOLIC BLOOD PRESSURE: 103 MMHG

## 2019-01-07 DIAGNOSIS — J20.9 ACUTE BRONCHITIS, UNSPECIFIED ORGANISM: Primary | ICD-10-CM

## 2019-01-07 PROCEDURE — 99213 OFFICE O/P EST LOW 20 MIN: CPT | Performed by: FAMILY MEDICINE

## 2019-01-07 RX ORDER — AZITHROMYCIN 250 MG/1
TABLET, FILM COATED ORAL
Qty: 6 TABLET | Refills: 0 | Status: SHIPPED | OUTPATIENT
Start: 2019-01-07 | End: 2019-01-17

## 2019-08-19 ENCOUNTER — TELEPHONE (OUTPATIENT)
Dept: FAMILY MEDICINE CLINIC | Age: 44
End: 2019-08-19

## 2019-08-19 ENCOUNTER — NURSE ONLY (OUTPATIENT)
Dept: FAMILY MEDICINE CLINIC | Age: 44
End: 2019-08-19
Payer: COMMERCIAL

## 2019-08-19 DIAGNOSIS — N39.0 URINARY TRACT INFECTION WITHOUT HEMATURIA, SITE UNSPECIFIED: Primary | ICD-10-CM

## 2019-08-19 LAB
BILIRUBIN, POC: ABNORMAL
BLOOD URINE, POC: ABNORMAL
CLARITY, POC: CLEAR
COLOR, POC: ABNORMAL
GLUCOSE URINE, POC: ABNORMAL
KETONES, POC: ABNORMAL
LEUKOCYTE EST, POC: NEGATIVE
NITRITE, POC: POSITIVE
PH, POC: 5.5
PROTEIN, POC: ABNORMAL
SPECIFIC GRAVITY, POC: 1.02
UROBILINOGEN, POC: 0.2

## 2019-08-19 PROCEDURE — 81002 URINALYSIS NONAUTO W/O SCOPE: CPT | Performed by: FAMILY MEDICINE

## 2019-08-19 RX ORDER — NITROFURANTOIN 25; 75 MG/1; MG/1
100 CAPSULE ORAL 2 TIMES DAILY
Qty: 14 CAPSULE | Refills: 0 | Status: SHIPPED | OUTPATIENT
Start: 2019-08-19 | End: 2019-08-26

## 2019-08-22 LAB
ORGANISM: ABNORMAL
URINE CULTURE, ROUTINE: ABNORMAL

## 2019-09-24 ENCOUNTER — HOSPITAL ENCOUNTER (EMERGENCY)
Age: 44
Discharge: HOME | End: 2019-09-24
Attending: EMERGENCY MEDICINE
Payer: COMMERCIAL

## 2019-09-24 ENCOUNTER — APPOINTMENT (OUTPATIENT)
Dept: GENERAL RADIOLOGY | Age: 44
End: 2019-09-24
Payer: COMMERCIAL

## 2019-09-24 VITALS
WEIGHT: 210 LBS | SYSTOLIC BLOOD PRESSURE: 132 MMHG | HEIGHT: 66 IN | DIASTOLIC BLOOD PRESSURE: 62 MMHG | RESPIRATION RATE: 16 BRPM | HEART RATE: 74 BPM | TEMPERATURE: 98.5 F | BODY MASS INDEX: 33.75 KG/M2 | OXYGEN SATURATION: 99 %

## 2019-09-24 DIAGNOSIS — F41.1 ANXIETY REACTION: Primary | ICD-10-CM

## 2019-09-24 PROCEDURE — 99284 EMERGENCY DEPT VISIT MOD MDM: CPT

## 2019-09-24 PROCEDURE — 71045 X-RAY EXAM CHEST 1 VIEW: CPT

## 2019-09-24 PROCEDURE — 6370000000 HC RX 637 (ALT 250 FOR IP)

## 2019-09-24 PROCEDURE — 6370000000 HC RX 637 (ALT 250 FOR IP): Performed by: EMERGENCY MEDICINE

## 2019-09-24 RX ORDER — IPRATROPIUM BROMIDE AND ALBUTEROL SULFATE 2.5; .5 MG/3ML; MG/3ML
1 SOLUTION RESPIRATORY (INHALATION) ONCE
Status: COMPLETED | OUTPATIENT
Start: 2019-09-24 | End: 2019-09-24

## 2019-09-24 RX ORDER — PREDNISONE 20 MG/1
40 TABLET ORAL ONCE
Status: COMPLETED | OUTPATIENT
Start: 2019-09-24 | End: 2019-09-24

## 2019-09-24 RX ORDER — IPRATROPIUM BROMIDE AND ALBUTEROL SULFATE 2.5; .5 MG/3ML; MG/3ML
SOLUTION RESPIRATORY (INHALATION)
Status: COMPLETED
Start: 2019-09-24 | End: 2019-09-24

## 2019-09-24 RX ADMIN — IPRATROPIUM BROMIDE AND ALBUTEROL SULFATE 6 ML: 2.5; .5 SOLUTION RESPIRATORY (INHALATION) at 04:54

## 2019-09-24 RX ADMIN — IPRATROPIUM BROMIDE AND ALBUTEROL SULFATE 6 ML: .5; 3 SOLUTION RESPIRATORY (INHALATION) at 04:54

## 2019-09-24 RX ADMIN — PREDNISONE 40 MG: 20 TABLET ORAL at 05:10

## 2019-09-24 NOTE — ED PROVIDER NOTES
needs: Medical: Not on file     Non-medical: Not on file   Tobacco Use    Smoking status: Never Smoker    Smokeless tobacco: Never Used   Substance and Sexual Activity    Alcohol use: No    Drug use: No    Sexual activity: Not on file   Lifestyle    Physical activity:     Days per week: Not on file     Minutes per session: Not on file    Stress: Not on file   Relationships    Social connections:     Talks on phone: Not on file     Gets together: Not on file     Attends L[employer]-day service: Not on file     Active member of club or organization: Not on file     Attends meetings of clubs or organizations: Not on file     Relationship status: Not on file    Intimate partner violence:     Fear of current or ex partner: Not on file     Emotionally abused: Not on file     Physically abused: Not on file     Forced sexual activity: Not on file   Other Topics Concern    Not on file   Social History Narrative    Not on file     No current facility-administered medications for this encounter. Current Outpatient Medications   Medication Sig Dispense Refill    albuterol sulfate HFA (PROAIR HFA) 108 (90 BASE) MCG/ACT inhaler Inhale 2 puffs into the lungs every 4 hours as needed 1 Inhaler 3     Allergies   Allergen Reactions    Latex     Other      bandaid adhesive     Nursing Notes Reviewed    Physical Exam:  ED Triage Vitals [09/24/19 0798]   Enc Vitals Group      /62      Pulse 74      Resp 16      Temp 98.5 °F (36.9 °C)      Temp src       SpO2 99 %      Weight 210 lb (95.3 kg)      Height 5' 6\" (1.676 m)      Head Circumference       Peak Flow       Pain Score       Pain Loc       Pain Edu? Excl. in [address]?         GENERAL APPEARANCE: A well-developed well-nourished pleasant anxious 60-year-old female in mild to moderate distress  HEAD: Normocephalic, atraumatic  EYES: Sclera anicteric.no conjunctival injection,  ENT: Mucous membranes moist, clear nasal discharge, pharynx clear, no stridor,   HEART:

## 2020-03-10 ENCOUNTER — OFFICE VISIT (OUTPATIENT)
Dept: FAMILY MEDICINE CLINIC | Age: 45
End: 2020-03-10
Payer: COMMERCIAL

## 2020-03-10 VITALS
OXYGEN SATURATION: 97 % | WEIGHT: 222.4 LBS | SYSTOLIC BLOOD PRESSURE: 110 MMHG | DIASTOLIC BLOOD PRESSURE: 74 MMHG | TEMPERATURE: 100 F | BODY MASS INDEX: 35.9 KG/M2 | HEART RATE: 97 BPM

## 2020-03-10 LAB
INFLUENZA A ANTIBODY: NORMAL
INFLUENZA B ANTIBODY: NORMAL

## 2020-03-10 PROCEDURE — 87804 INFLUENZA ASSAY W/OPTIC: CPT | Performed by: NURSE PRACTITIONER

## 2020-03-10 PROCEDURE — 99213 OFFICE O/P EST LOW 20 MIN: CPT | Performed by: NURSE PRACTITIONER

## 2020-03-10 RX ORDER — OSELTAMIVIR PHOSPHATE 75 MG/1
75 CAPSULE ORAL 2 TIMES DAILY
Qty: 10 CAPSULE | Refills: 0 | Status: SHIPPED | OUTPATIENT
Start: 2020-03-10 | End: 2020-03-15

## 2020-03-10 ASSESSMENT — ENCOUNTER SYMPTOMS
COUGH: 1
SINUS PRESSURE: 0
SHORTNESS OF BREATH: 1
GASTROINTESTINAL NEGATIVE: 1
SINUS PAIN: 0
EYES NEGATIVE: 1

## 2020-03-10 NOTE — PATIENT INSTRUCTIONS
Please read the healthy family handout that you were given and share it with your family. Please compare this printed medication list with your medications at home to be sure they are the same. If you have any medications that are different please contact us immediately at [phone]. Also review your allergies that we have listed, these may also include medications that you have not been able to tolerate, make sure everything listed is correct. If you have any allergies that are different please contact us immediately at [phone]. Patient Education      Recommend rest, drink plenty of fluids, take otc ibuprofen or tylenol for fevers/aches, take tamiflu as ordered, may return to work when fever free X 24 hours and Patient to f/u if no better or worsening of symptoms. Influenza (Flu): Care Instructions  Your Care Instructions    Influenza (flu) is an infection in the lungs and breathing passages. It is caused by the influenza virus. There are different strains, or types, of the flu virus from year to year. Unlike the common cold, the flu comes on suddenly and the symptoms, such as a cough, congestion, fever, chills, fatigue, aches, and pains, are more severe. These symptoms may last up to 10 days. Although the flu can make you feel very sick, it usually doesn't cause serious health problems. Home treatment is usually all you need for flu symptoms. But your doctor may prescribe antiviral medicine to prevent other health problems, such as pneumonia, from developing. Older people and those who have a long-term health condition, such as lung disease, are most at risk for having pneumonia or other health problems. Follow-up care is a key part of your treatment and safety. Be sure to make and go to all appointments, and call your doctor if you are having problems. It's also a good idea to know your test results and keep a list of the medicines you take. How can you care for yourself at home?   · Get plenty of rest.  · Drink plenty of fluids, enough so that your urine is light yellow or clear like water. If you have kidney, heart, or liver disease and have to limit fluids, talk with your doctor before you increase the amount of fluids you drink. · Take an over-the-counter pain medicine if needed, such as acetaminophen (Tylenol), ibuprofen (Advil, Motrin), or naproxen (Aleve), to relieve fever, headache, and muscle aches. Read and follow all instructions on the label. No one younger than 20 should take aspirin. It has been linked to Reye syndrome, a serious illness. · Do not smoke. Smoking can make the flu worse. If you need help quitting, talk to your doctor about stop-smoking programs and medicines. These can increase your chances of quitting for good. · Breathe moist air from a hot shower or from a sink filled with hot water to help clear a stuffy nose. · Before you use cough and cold medicines, check the label. These medicines may not be safe for young children or for people with certain health problems. · If the skin around your nose and lips becomes sore, put some petroleum jelly on the area. · To ease coughing:  ? Drink fluids to soothe a scratchy throat. ? Suck on cough drops or plain hard candy. ? Take an over-the-counter cough medicine that contains dextromethorphan to help you get some sleep. Read and follow all instructions on the label. ? Raise your head at night with an extra pillow. This may help you rest if coughing keeps you awake. · Take any prescribed medicine exactly as directed. Call your doctor if you think you are having a problem with your medicine. To avoid spreading the flu  · Wash your hands regularly, and keep your hands away from your face. · Stay home from school, work, and other public places until you are feeling better and your fever has been gone for at least 24 hours. The fever needs to have gone away on its own without the help of medicine.   · Ask people living with you to talk to their doctors about preventing the flu. They may get antiviral medicine to keep from getting the flu from you. · To prevent the flu in the future, get a flu vaccine every fall. Encourage people living with you to get the vaccine. · Cover your mouth when you cough or sneeze. When should you call for help? Call 911 anytime you think you may need emergency care. For example, call if:    · You have severe trouble breathing.    Call your doctor now or seek immediate medical care if:    · You have new or worse trouble breathing.     · You seem to be getting much sicker.     · You feel very sleepy or confused.     · You have a new or higher fever.     · You get a new rash.    Watch closely for changes in your health, and be sure to contact your doctor if:    · You begin to get better and then get worse.     · You are not getting better after 1 week. Where can you learn more? Go to https://[employer]. org and sign in to your [employer] account. Enter A321 in the [employer] box to learn more about \"Influenza (Flu): Care Instructions. \"     If you do not have an account, please click on the \"Sign Up Now\" link. Current as of: June 9, 2019  Content Version: 12.3  © 9010-6280 Healthwise, [employer]. Care instructions adapted under license by [facility] ([facility]). If you have questions about a medical condition or this instruction, always ask your healthcare professional. [place] [address] any warranty or liability for your use of this information. Patient Education        oseltamivir  Pronunciation:  os el SABILLON ih veer  Brand:  Tamiflu  What is the most important information I should know about oseltamivir? Some people using oseltamivir have had sudden unusual changes in mood or behavior, most often in children. It is not certain that oseltamivir is the exact cause.  Even without using oseltamivir, anyone with influenza can have neurologic or behavioral effects taking oseltamivir. Oseltamivir may interfere with the drug action of FluMist, making the vaccine less effective. Follow your doctor's instructions. What are the possible side effects of oseltamivir? Get emergency medical help if you have signs of an allergic reaction (hives, difficult breathing, swelling in your face or throat) or a severe skin reaction (fever, sore throat, burning eyes, skin pain, red or purple skin rash with blistering and peeling). Some people using oseltamivir (especially children) have had sudden unusual changes in mood or behavior. It is not certain that oseltamivir is the exact cause of these symptoms. Even without using oseltamivir, anyone with influenza can have neurologic or behavioral symptoms. Call your doctor right away if the person using this medicine has:  · sudden confusion;  · tremors or shaking;  · unusual behavior; or  · hallucinations (hearing or seeing things that are not there). Common side effects may include:  · nausea, vomiting;  · headache; or  · pain. This is not a complete list of side effects and others may occur. Call your doctor for medical advice about side effects. You may report side effects to FDA at 4-216-FDA-2940. What other drugs will affect oseltamivir? Other drugs may affect oseltamivir, including prescription and over-the-counter medicines, vitamins, and herbal products. Tell your doctor about all your current medicines and any medicine you start or stop using. Where can I get more information? Your pharmacist can provide more information about oseltamivir. Remember, keep this and all other medicines out of the reach of children, never share your medicines with others, and use this medication only for the indication prescribed. Every effort has been made to ensure that the information provided by [address] is accurate, up-to-date, and complete, but no guarantee is made to that effect.  Drug information contained herein may be time sensitive. [employer] information has been compiled for use by healthcare practitioners and consumers in the [place] and therefore [employer] does not warrant that uses outside of the [place] are appropriate, unless specifically indicated otherwise. [facility]'s drug information does not endorse drugs, diagnose patients or recommend therapy. [facility]'s drug information is an informational resource designed to assist licensed healthcare practitioners in caring for their patients and/or to serve consumers viewing this service as a supplement to, and not a substitute for, the expertise, skill, knowledge and judgment of healthcare practitioners. The absence of a warning for a given drug or drug combination in no way should be construed to indicate that the drug or drug combination is safe, effective or appropriate for any given patient. [facility] does not assume any responsibility for any aspect of healthcare administered with the aid of information [facility] provides. The information contained herein is not intended to cover all possible uses, directions, precautions, warnings, drug interactions, allergic reactions, or adverse effects. If you have questions about the drugs you are taking, check with your doctor, nurse or pharmacist.  Copyright 4369-2903 [address]: 12.02. Revision date: 9/25/2018. Care instructions adapted under license by [facility] ([facility]). If you have questions about a medical condition or this instruction, always ask your healthcare professional. [place] [address] any warranty or liability for your use of this information.

## 2020-03-10 NOTE — PROGRESS NOTES
including supportive measures. Recommend Tamiflu 75 mg twice daily x5 days. Advised of the possibility of secondary infections following viral infections as patient does have history of pneumonia. Advised to follow-up as needed. Instructed to remain off work and avoid public places until she is fever free for 24 hours. Patient verbalized understanding and agreeable to plan. Also see patient instructions. - oseltamivir (TAMIFLU) 75 MG capsule; Take 1 capsule by mouth 2 times daily for 5 days  Dispense: 10 capsule;  Refill: 0  - POCT Influenza A/B

## 2020-03-17 ENCOUNTER — OFFICE VISIT (OUTPATIENT)
Dept: FAMILY MEDICINE CLINIC | Age: 45
End: 2020-03-17
Payer: COMMERCIAL

## 2020-03-17 VITALS
SYSTOLIC BLOOD PRESSURE: 110 MMHG | WEIGHT: 225.8 LBS | TEMPERATURE: 99.3 F | BODY MASS INDEX: 36.45 KG/M2 | DIASTOLIC BLOOD PRESSURE: 74 MMHG | HEART RATE: 69 BPM | OXYGEN SATURATION: 97 %

## 2020-03-17 PROCEDURE — 99213 OFFICE O/P EST LOW 20 MIN: CPT | Performed by: NURSE PRACTITIONER

## 2020-03-17 RX ORDER — AMOXICILLIN AND CLAVULANATE POTASSIUM 875; 125 MG/1; MG/1
1 TABLET, FILM COATED ORAL 2 TIMES DAILY
Qty: 14 TABLET | Refills: 0 | Status: SHIPPED | OUTPATIENT
Start: 2020-03-17 | End: 2020-03-24

## 2020-03-17 ASSESSMENT — ENCOUNTER SYMPTOMS
SORE THROAT: 0
SINUS PRESSURE: 0
COUGH: 1
EYES NEGATIVE: 1
CHEST TIGHTNESS: 1

## 2020-03-17 NOTE — PROGRESS NOTES
Subjective:      Patient ID: Claudine Cooper is a 38 y.o. female. HPI    Chief Complaint   Patient presents with    Fever    Otalgia     left     Patient presents today with c/o intermittent low grade fevers, nasal stuffiness, nose bleeds and chest tightness. Patient is using inhaler which has improved chest tightness. Review of Systems   Constitutional: Positive for fever. Negative for appetite change. HENT: Positive for ear pain. Negative for sinus pressure and sore throat. Eyes: Negative. Respiratory: Positive for cough (occasional) and chest tightness. Cardiovascular: Negative. Genitourinary: Negative. Skin: Negative. Neurological: Positive for headaches. Patient Active Problem List   Diagnosis    Chronic constipation    Anxiety    Vertigo    Back pain    Chronic migraine without aura without status migrainosus, not intractable    Primary insomnia    Gastroesophageal reflux disease without esophagitis    Mild intermittent asthma without complication    Bipolar disorder, current episode mixed, mild (HCC)    Medication management    Closed fracture of right distal radius       Outpatient Medications Marked as Taking for the 3/17/20 encounter (Office Visit) with SHELBY Kim CNP   Medication Sig Dispense Refill    albuterol sulfate HFA (PROAIR HFA) 108 (90 BASE) MCG/ACT inhaler Inhale 2 puffs into the lungs every 4 hours as needed 1 Inhaler 3       Allergies   Allergen Reactions    Latex     Other      bandaid adhesive       Social History     Tobacco Use    Smoking status: Never Smoker    Smokeless tobacco: Never Used   Substance Use Topics    Alcohol use: No       Objective:   /74   Pulse 69   Temp 99.3 °F (37.4 °C) (Oral)   Wt 225 lb 12.8 oz (102.4 kg)   SpO2 97%   BMI 36.45 kg/m²     Physical Exam  Vitals signs and nursing note reviewed. Constitutional:       Appearance: She is well-developed.  She is not ill-appearing or

## 2020-03-17 NOTE — PATIENT INSTRUCTIONS
Please read the healthy family handout that you were given and share it with your family. Please compare this printed medication list with your medications at home to be sure they are the same. If you have any medications that are different please contact us immediately at [phone]. Also review your allergies that we have listed, these may also include medications that you have not been able to tolerate, make sure everything listed is correct. If you have any allergies that are different please contact us immediately at [phone]. Patient Education        Ear Infection (Otitis Media): Care Instructions  Your Care Instructions    An ear infection may start with a cold and affect the middle ear (otitis media). It can hurt a lot. Most ear infections clear up on their own in a couple of days. Most often you will not need antibiotics. This is because many ear infections are caused by a virus. Antibiotics don't work against a virus. Regular doses of pain medicines are the best way to reduce your fever and help you feel better. Follow-up care is a key part of your treatment and safety. Be sure to make and go to all appointments, and call your doctor if you are having problems. It's also a good idea to know your test results and keep a list of the medicines you take. How can you care for yourself at home? · Take pain medicines exactly as directed. ? If the doctor gave you a prescription medicine for pain, take it as prescribed. ? If you are not taking a prescription pain medicine, take an over-the-counter medicine, such as acetaminophen (Tylenol), ibuprofen (Advil, Motrin), or naproxen (Aleve). Read and follow all instructions on the label. ? Do not take two or more pain medicines at the same time unless the doctor told you to. Many pain medicines have acetaminophen, which is Tylenol. Too much acetaminophen (Tylenol) can be harmful. · Plan to take a full dose of pain reliever before bedtime.  Getting enough sleep will help you get better. · Try a warm, moist washcloth on the ear. It may help relieve pain. · If your doctor prescribed antibiotics, take them as directed. Do not stop taking them just because you feel better. You need to take the full course of antibiotics. When should you call for help? Call your doctor now or seek immediate medical care if:    · You have new or increasing ear pain.     · You have new or increasing pus or blood draining from your ear.     · You have a fever with a stiff neck or a severe headache.    Watch closely for changes in your health, and be sure to contact your doctor if:    · You have new or worse symptoms.     · You are not getting better after taking an antibiotic for 2 days. Where can you learn more? Go to https://ADAPTIX.[employer]. org and sign in to your [employer] account. Enter V322 in the KylesVungle box to learn more about \"Ear Infection (Otitis Media): Care Instructions. \"     If you do not have an account, please click on the \"Sign Up Now\" link. Current as of: July 28, 2019Content Version: 12.4  © 5557-9042 [employer]. Care instructions adapted under license by [facility] [address] If you have questions about a medical condition or this instruction, always ask your healthcare professional. [address] any warranty or liability for your use of this information. Patient Education        amoxicillin and clavulanate potassium  Pronunciation:  am OK i JOHANA in [address] TAS e[address] um  Brand:  Augmentin, Augmentin ES-600, Augmentin XR  What is the most important information I should know about amoxicillin and clavulanate potassium?   You should not use this medicine if you have severe kidney disease, if you have had liver problems or jaundice while taking amoxicillin and clavulanate potassium, or if you are allergic to any penicillin or cephalosporin antibiotic, such as Amoxil, Ceftin, Cefzil, Moxatag, (PKU). How should I take amoxicillin and clavulanate potassium? Follow all directions on your prescription label. Do not take this medicine in larger or smaller amounts or for longer than recommended. Take the medicine every 12 hours, at the start of a meal to reduce stomach upset. Do not crush or chew the extended-release tablet. Swallow the pill whole, or break the pill in half and take both halves one at a time. If you have trouble swallowing a whole or half pill, talk with your doctor about using another form of amoxicillin and clavulanate potassium. The chewable tablet must be chewed before you swallow it. Shake the liquid medicine well just before you measure a dose. Measure liquid medicine with the dosing syringe provided, or with a special dose-measuring spoon or medicine cup. If you do not have a dose-measuring device, ask your pharmacist for one. Use this medicine for the full prescribed length of time. Your symptoms may improve before the infection is completely cleared. Skipping doses may also increase your risk of further infection that is resistant to antibiotics. Amoxicillin and clavulanate potassium will not treat a viral infection such as the flu or a common cold. This medicine can cause unusual results with certain lab tests for glucose (sugar) in the urine. Tell any doctor who treats you that you are using amoxicillin and clavulanate potassium. Store the tablets at room temperature away from moisture and heat. Store the liquid  in the refrigerator. Throw away any unused liquid after 10 days. What happens if I miss a dose? Take the missed dose as soon as you remember. Skip the missed dose if it is almost time for your next scheduled dose. Do not take extra medicine to make up the missed dose. What happens if I overdose? Seek emergency medical attention or call the Poison Help line at 1-696.980.7643.   Overdose can cause nausea, vomiting, stomach pain, diarrhea, skin rash, drowsiness,

## 2020-03-23 ENCOUNTER — TELEPHONE (OUTPATIENT)
Dept: FAMILY MEDICINE CLINIC | Age: 45
End: 2020-03-23

## 2020-03-23 RX ORDER — DOXYCYCLINE HYCLATE 100 MG
100 TABLET ORAL 2 TIMES DAILY
Qty: 20 TABLET | Refills: 0 | Status: SHIPPED | OUTPATIENT
Start: 2020-03-23 | End: 2020-04-02

## 2020-03-23 NOTE — TELEPHONE ENCOUNTER
Patient was seen in the office last week and was given Augmentin for ear pain and fever. She said her symptoms have not really improved any at all. Still has fever, ear pain and sinus headache.   Wanted to see if you would call in something else for her or if she needs seen again

## 2020-03-31 ENCOUNTER — OFFICE VISIT (OUTPATIENT)
Dept: FAMILY MEDICINE CLINIC | Age: 45
End: 2020-03-31
Payer: COMMERCIAL

## 2020-03-31 VITALS
SYSTOLIC BLOOD PRESSURE: 102 MMHG | WEIGHT: 229 LBS | TEMPERATURE: 97.9 F | BODY MASS INDEX: 36.96 KG/M2 | OXYGEN SATURATION: 98 % | HEART RATE: 64 BPM | DIASTOLIC BLOOD PRESSURE: 69 MMHG

## 2020-03-31 PROBLEM — N20.0 KIDNEY STONE: Status: ACTIVE | Noted: 2020-03-31

## 2020-03-31 LAB
BILIRUBIN, POC: NORMAL
BLOOD URINE, POC: NORMAL
CLARITY, POC: CLEAR
COLOR, POC: YELLOW
GLUCOSE URINE, POC: NORMAL
KETONES, POC: NORMAL
LEUKOCYTE EST, POC: NORMAL
NITRITE, POC: NORMAL
PH, POC: 6
PROTEIN, POC: NORMAL
SPECIFIC GRAVITY, POC: >1.03
UROBILINOGEN, POC: 0.2

## 2020-03-31 PROCEDURE — 99214 OFFICE O/P EST MOD 30 MIN: CPT | Performed by: FAMILY MEDICINE

## 2020-03-31 PROCEDURE — 81002 URINALYSIS NONAUTO W/O SCOPE: CPT | Performed by: FAMILY MEDICINE

## 2020-03-31 RX ORDER — NITROFURANTOIN 25; 75 MG/1; MG/1
100 CAPSULE ORAL 2 TIMES DAILY
Qty: 20 CAPSULE | Refills: 0 | Status: SHIPPED | OUTPATIENT
Start: 2020-03-31 | End: 2020-04-10

## 2020-03-31 RX ORDER — FLUTICASONE FUROATE AND VILANTEROL TRIFENATATE 100; 25 UG/1; UG/1
1 POWDER RESPIRATORY (INHALATION) DAILY
Qty: 1 EACH | Refills: 5 | Status: SHIPPED | OUTPATIENT
Start: 2020-03-31 | End: 2021-11-10

## 2020-03-31 RX ORDER — ONDANSETRON 4 MG/1
4 TABLET, ORALLY DISINTEGRATING ORAL 3 TIMES DAILY PRN
Qty: 21 TABLET | Refills: 5 | Status: SHIPPED | OUTPATIENT
Start: 2020-03-31 | End: 2021-11-10

## 2020-03-31 RX ORDER — ALBUTEROL SULFATE 90 UG/1
2 AEROSOL, METERED RESPIRATORY (INHALATION) 4 TIMES DAILY PRN
Qty: 1 INHALER | Refills: 5 | Status: SHIPPED | OUTPATIENT
Start: 2020-03-31 | End: 2021-08-16 | Stop reason: SDUPTHER

## 2020-03-31 NOTE — PROGRESS NOTES
Subjective:  Shira Wheeler is here to discuss the following issues. She has a history of UTIs and complains of recurrence. She has some burning frequency and urgency. No fever sweats or chills no wheezing or cough no sore throat nausea vomiting or diarrhea. She did have some nausea. She has a history of kidney stones and had some mild right right flank pain that resolved. She has asthma and has some mild chronic shortness of breath but has been hesitant to increase medication due to expense. She is now agreeable. She has some left ear pressure and pain unresponsive to doxycycline. No chest pain or chest pressure  Social History     Tobacco Use   Smoking Status Never Smoker   Smokeless Tobacco Never Used   Allergies:     Latex and Other    Objective:  /69   Pulse 64   Temp 97.9 °F (36.6 °C) (Oral)   Wt 229 lb (103.9 kg)   SpO2 98%   BMI 36.96 kg/m²    No acute distress, heart regular rate and rhythm without murmur, lungs clear to auscultation easy effort, abdomen soft nondistended, no clubbing or cyanosis no flank tenderness left ear shows serous otitis    Assessment:  1. Acute cystitis without hematuria    2. Mild intermittent asthma without complication    3. Non-recurrent acute serous otitis media of left ear    4. Kidney stone            Plan:  UA  Macrobid  Breo  Albuterol  Advised on OTC medications  Zofran as needed  She has a very high deductible to her insurance and will start Breo if it is not too expensive  No culture due to expense  Follow-up in 6 months or as needed  \"Healthy Family Handout\" provided  Avoid exposure to all tobacco products.   Read and consider all information provided by the pharmacy regarding prescribed medications before use  Careful medical compliance  Proper diet and weight management   Otherwise continue current treatment plan  Call or return if symptoms are not well controlled  Go to ED if severe/significant symptoms occur    All medical conditions for this patient are stable unless otherwise indicated    Aj Leblanc MD    This note was transcribed using a voice recognition software system. Proper technique and careful oversight were used to increase transcription accuracy but inadvertent errors may be present.

## 2020-04-22 ENCOUNTER — TELEPHONE (OUTPATIENT)
Dept: FAMILY MEDICINE CLINIC | Age: 45
End: 2020-04-22

## 2020-04-22 RX ORDER — LEVOFLOXACIN 500 MG/1
500 TABLET, FILM COATED ORAL DAILY
Qty: 10 TABLET | Refills: 0 | Status: SHIPPED | OUTPATIENT
Start: 2020-04-22 | End: 2020-05-02

## 2020-09-11 ENCOUNTER — OFFICE VISIT (OUTPATIENT)
Dept: FAMILY MEDICINE CLINIC | Age: 45
End: 2020-09-11
Payer: COMMERCIAL

## 2020-09-11 VITALS
SYSTOLIC BLOOD PRESSURE: 111 MMHG | WEIGHT: 217.25 LBS | OXYGEN SATURATION: 99 % | TEMPERATURE: 98.1 F | BODY MASS INDEX: 35.07 KG/M2 | HEART RATE: 59 BPM | DIASTOLIC BLOOD PRESSURE: 72 MMHG

## 2020-09-11 PROCEDURE — 99214 OFFICE O/P EST MOD 30 MIN: CPT | Performed by: NURSE PRACTITIONER

## 2020-09-11 RX ORDER — MECLIZINE HCL 12.5 MG/1
12.5 TABLET ORAL 3 TIMES DAILY PRN
Qty: 15 TABLET | Refills: 0 | Status: SHIPPED | OUTPATIENT
Start: 2020-09-11 | End: 2020-09-21

## 2020-09-11 RX ORDER — LORATADINE 10 MG/1
10 TABLET ORAL DAILY
Qty: 30 TABLET | Refills: 0 | Status: SHIPPED | OUTPATIENT
Start: 2020-09-11 | End: 2020-10-11

## 2020-09-11 ASSESSMENT — ENCOUNTER SYMPTOMS
NAUSEA: 0
RHINORRHEA: 0
CHEST TIGHTNESS: 0
WHEEZING: 0
DIARRHEA: 0
SORE THROAT: 0
SHORTNESS OF BREATH: 0
ABDOMINAL PAIN: 0
ABDOMINAL DISTENTION: 0
COUGH: 0
VOMITING: 0

## 2020-09-11 NOTE — PROGRESS NOTES
Not on file     Inability: Not on file    Transportation needs     Medical: Not on file     Non-medical: Not on file   Tobacco Use    Smoking status: Never Smoker    Smokeless tobacco: Never Used   Substance and Sexual Activity    Alcohol use: No    Drug use: No    Sexual activity: Not on file   Lifestyle    Physical activity     Days per week: Not on file     Minutes per session: Not on file    Stress: Not on file   Relationships    Social connections     Talks on phone: Not on file     Gets together: Not on file     Attends Congregational service: Not on file     Active member of club or organization: Not on file     Attends meetings of clubs or organizations: Not on file     Relationship status: Not on file    Intimate partner violence     Fear of current or ex partner: Not on file     Emotionally abused: Not on file     Physically abused: Not on file     Forced sexual activity: Not on file   Other Topics Concern    Not on file   Social History Narrative    Not on file     Current Outpatient Medications   Medication Sig Dispense Refill    meclizine (ANTIVERT) 12.5 MG tablet Take 1 tablet by mouth 3 times daily as needed for Dizziness or Nausea 15 tablet 0    loratadine (CLARITIN) 10 MG tablet Take 1 tablet by mouth daily 30 tablet 0    ondansetron (ZOFRAN-ODT) 4 MG disintegrating tablet Take 1 tablet by mouth 3 times daily as needed for Nausea or Vomiting 21 tablet 5    albuterol sulfate HFA (VENTOLIN HFA) 108 (90 Base) MCG/ACT inhaler Inhale 2 puffs into the lungs 4 times daily as needed for Wheezing 1 Inhaler 5    fluticasone-vilanterol (BREO ELLIPTA) 100-25 MCG/INH AEPB inhaler Inhale 1 puff into the lungs daily 1 each 5    albuterol sulfate HFA (PROAIR HFA) 108 (90 BASE) MCG/ACT inhaler Inhale 2 puffs into the lungs every 4 hours as needed 1 Inhaler 3     No current facility-administered medications for this visit.       Allergies   Allergen Reactions    Latex     Other      bandaid adhesive REVIEW OF SYSTEMS  Review of Systems   Constitutional: Negative for activity change, appetite change, chills and fever. HENT: Positive for ear pain. Negative for congestion, rhinorrhea and sore throat. Eyes: Negative for visual disturbance. Respiratory: Negative for cough, chest tightness, shortness of breath and wheezing. Cardiovascular: Negative for chest pain and leg swelling. Gastrointestinal: Negative for abdominal distention, abdominal pain, diarrhea, nausea and vomiting. Genitourinary: Negative for dysuria, frequency, hematuria and urgency. Musculoskeletal: Negative for gait problem and myalgias. Skin: Negative for rash. Neurological: Positive for light-headedness. Negative for dizziness, weakness, numbness and headaches. Psychiatric/Behavioral: Negative for sleep disturbance. PHYSICAL EXAM  /72   Pulse 59   Temp 98.1 °F (36.7 °C) (Oral)   Wt 217 lb 4 oz (98.5 kg)   SpO2 99%   BMI 35.07 kg/m²   Physical Exam  Vitals signs reviewed. Constitutional:       General: She is not in acute distress. Appearance: Normal appearance. She is well-developed. She is not diaphoretic. HENT:      Head: Normocephalic and atraumatic. Right Ear: Tympanic membrane is bulging. Tympanic membrane is not erythematous. Left Ear: Tympanic membrane is bulging. Tympanic membrane is not erythematous. Nose: Nose normal.      Mouth/Throat:      Mouth: Mucous membranes are moist.      Pharynx: No posterior oropharyngeal erythema. Eyes:      General:         Right eye: No discharge. Left eye: No discharge. Pupils: Pupils are equal, round, and reactive to light. Neck:      Musculoskeletal: Normal range of motion and neck supple. Vascular: No JVD. Cardiovascular:      Rate and Rhythm: Normal rate and regular rhythm. Pulses: Normal pulses. Pulmonary:      Effort: Pulmonary effort is normal. No respiratory distress. Breath sounds: No stridor. acknowledges with plan of care. - meclizine (ANTIVERT) 12.5 MG tablet; Take 1 tablet by mouth 3 times daily as needed for Dizziness or Nausea  Dispense: 15 tablet; Refill: 0  - loratadine (CLARITIN) 10 MG tablet; Take 1 tablet by mouth daily  Dispense: 30 tablet; Refill: 0           The note was completed using Dragon voice recognition transcription. Every effort was made to ensure accuracy; however, inadvertent  transcription errors may be present despite my best efforts to edit errors.     SHELBY French - CNP

## 2020-09-11 NOTE — PATIENT INSTRUCTIONS
Please read the healthy family handout that you were given and share it with your family. Please compare this printed medication list with your medications at home to be sure they are the same. If you have any medications that are different please contact us immediately at [phone]. Also review your allergies that we have listed, these may also include medications that you have not been able to tolerate, make sure everything listed is correct. If you have any allergies that are different please contact us immediately at [phone]. Patient Education        C[place]orne Exercises for Vertigo: Care Instructions  Your Care Instructions  Simple exercises can help you regain your balance when you have vertigo. If you have Ménière's disease, benign paroxysmal positional vertigo (BPPV), or another inner ear problem, you may have vertigo off and on. Do these exercises first thing in the morning and before you go to bed. You might get dizzy when you first start them. If this happens, try to do them for at least 5 minutes. Do a group of exercises at a time, starting at the top of the list. It may take several weeks before you can do all the exercises without feeling dizzy. Follow-up care is a key part of your treatment and safety. Be sure to make and go to all appointments, and call your doctor if you are having problems. It's also a good idea to know your test results and keep a list of the medicines you take. How can you care for yourself at home? Exercise 1  While sitting on the side of the bed and holding your head still:  · Look up as far as you can. · Look down as far as you can. · Look from side to side as far as you can. · Stretch your arm straight out in front of you. Focus on your index finger. Continue to focus on your finger while you bring it to your nose. Exercise 2  While sitting on the side of the bed:  · Bring your head as far back as you can.   · Bring your head forward to touch your chin to your chest.  · Turn your head from side to side. · Do these exercises first with your eyes open. Then try with your eyes closed. Exercise 3  While sitting on the side of the bed:  · Shrug your shoulders straight upward, then relax them. · Bend over and try to touch the ground with your fingers. Then go back to a sitting position. · Toss a small ball from one hand to the other. Throw the ball higher than your eyes so you have to look up. Exercise 4  While standing (with someone close by if you feel uncomfortable):  · Repeat Exercise 1.  · Repeat Exercise 2.  · Pass a ball between your legs and above your head. · Sit down and then stand up. Repeat. Turn around in a [place] a different way each time you stand. · With someone close by to help you, try the above exercises with your eyes closed. Exercise 5  In a room that is cleared of obstacles:  · Walk to a corner of the room, turn to your right, and walk back to the starting point. Now, repeat and turn left. · Walk up and down a slope. Now try stairs. · While holding on to someone's arm, try these exercises with your eyes closed. When should you call for help? Watch closely for changes in your health, and be sure to contact your doctor if:  · You do not get better as expected. Where can you learn more? Go to https://[employer] Healthpetikaeb.PharmAbcine. org and sign in to your [employer] account. Enter L685 in the BravoSolution box to learn more about \"Cawthorne Exercises for Vertigo: Care Instructions. \"     If you do not have an account, please click on the \"Sign Up Now\" link. Current as of: July 29, 2019               Content Version: 12.5  © 4054-3772 Healthwise, Incorporated. Care instructions adapted under license by [facility] ([facility]). If you have questions about a medical condition or this instruction, always ask your healthcare professional. [address] any warranty or liability for your use of this information.

## 2020-11-23 ENCOUNTER — HOSPITAL ENCOUNTER (OUTPATIENT)
Dept: MAMMOGRAPHY | Age: 45
Discharge: HOME | End: 2020-11-28
Payer: COMMERCIAL

## 2020-11-23 PROCEDURE — 77067 SCR MAMMO BI INCL CAD: CPT

## 2021-03-12 ENCOUNTER — OFFICE VISIT (OUTPATIENT)
Dept: FAMILY MEDICINE CLINIC | Age: 46
End: 2021-03-12
Payer: COMMERCIAL

## 2021-03-12 VITALS
TEMPERATURE: 98.8 F | HEIGHT: 66 IN | DIASTOLIC BLOOD PRESSURE: 66 MMHG | SYSTOLIC BLOOD PRESSURE: 98 MMHG | HEART RATE: 75 BPM | BODY MASS INDEX: 34.04 KG/M2 | OXYGEN SATURATION: 97 % | WEIGHT: 211.8 LBS

## 2021-03-12 DIAGNOSIS — M19.90 ARTHRITIS: ICD-10-CM

## 2021-03-12 DIAGNOSIS — R40.0 DAYTIME SOMNOLENCE: ICD-10-CM

## 2021-03-12 DIAGNOSIS — G47.9 SLEEP DISORDER: ICD-10-CM

## 2021-03-12 DIAGNOSIS — I95.9 HYPOTENSION, UNSPECIFIED HYPOTENSION TYPE: ICD-10-CM

## 2021-03-12 DIAGNOSIS — R53.83 FATIGUE, UNSPECIFIED TYPE: ICD-10-CM

## 2021-03-12 DIAGNOSIS — J45.20 MILD INTERMITTENT ASTHMA WITHOUT COMPLICATION: Primary | ICD-10-CM

## 2021-03-12 DIAGNOSIS — E55.9 VITAMIN D DEFICIENCY: ICD-10-CM

## 2021-03-12 DIAGNOSIS — E89.40 POSTSURGICAL MENOPAUSE: ICD-10-CM

## 2021-03-12 DIAGNOSIS — J30.9 ALLERGIC RHINITIS, UNSPECIFIED SEASONALITY, UNSPECIFIED TRIGGER: ICD-10-CM

## 2021-03-12 LAB
ALT SERPL-CCNC: 10 U/L (ref 10–40)
ANION GAP SERPL CALCULATED.3IONS-SCNC: 8 MMOL/L (ref 3–16)
AST SERPL-CCNC: 14 U/L (ref 15–37)
BASOPHILS ABSOLUTE: 0 K/UL (ref 0–0.2)
BASOPHILS RELATIVE PERCENT: 0.5 %
BUN BLDV-MCNC: 13 MG/DL (ref 7–20)
C-REACTIVE PROTEIN: <3 MG/L (ref 0–5.1)
CALCIUM SERPL-MCNC: 8.9 MG/DL (ref 8.3–10.6)
CHLORIDE BLD-SCNC: 108 MMOL/L (ref 99–110)
CO2: 24 MMOL/L (ref 21–32)
CORTISOL - AM: 6.4 UG/DL (ref 4.3–22.4)
CREAT SERPL-MCNC: 0.8 MG/DL (ref 0.6–1.1)
EOSINOPHILS ABSOLUTE: 0.1 K/UL (ref 0–0.6)
EOSINOPHILS RELATIVE PERCENT: 1.1 %
FOLATE: 8.49 NG/ML (ref 4.78–24.2)
FOLLICLE STIMULATING HORMONE: 3.6 MIU/ML
GFR AFRICAN AMERICAN: >60
GFR NON-AFRICAN AMERICAN: >60
GLUCOSE BLD-MCNC: 88 MG/DL (ref 70–99)
HCT VFR BLD CALC: 39.9 % (ref 36–48)
HEMOGLOBIN: 13.5 G/DL (ref 12–16)
LYMPHOCYTES ABSOLUTE: 1.7 K/UL (ref 1–5.1)
LYMPHOCYTES RELATIVE PERCENT: 27.7 %
MCH RBC QN AUTO: 29.6 PG (ref 26–34)
MCHC RBC AUTO-ENTMCNC: 33.7 G/DL (ref 31–36)
MCV RBC AUTO: 87.6 FL (ref 80–100)
MONOCYTES ABSOLUTE: 0.5 K/UL (ref 0–1.3)
MONOCYTES RELATIVE PERCENT: 8 %
NEUTROPHILS ABSOLUTE: 3.8 K/UL (ref 1.7–7.7)
NEUTROPHILS RELATIVE PERCENT: 62.7 %
PDW BLD-RTO: 13.4 % (ref 12.4–15.4)
PLATELET # BLD: 288 K/UL (ref 135–450)
PMV BLD AUTO: 8.7 FL (ref 5–10.5)
POTASSIUM SERPL-SCNC: 4.2 MMOL/L (ref 3.5–5.1)
RBC # BLD: 4.55 M/UL (ref 4–5.2)
RHEUMATOID FACTOR: <10 IU/ML
SEDIMENTATION RATE, ERYTHROCYTE: 10 MM/HR (ref 0–20)
SODIUM BLD-SCNC: 140 MMOL/L (ref 136–145)
TSH REFLEX: 0.88 UIU/ML (ref 0.27–4.2)
VITAMIN B-12: 606 PG/ML (ref 211–911)
VITAMIN D 25-HYDROXY: 23.8 NG/ML
WBC # BLD: 6.1 K/UL (ref 4–11)

## 2021-03-12 PROCEDURE — 99214 OFFICE O/P EST MOD 30 MIN: CPT | Performed by: FAMILY MEDICINE

## 2021-03-12 NOTE — PATIENT INSTRUCTIONS
Please read the healthy family handout that you were given and share it with your family. Please compare this printed medication list with your medications at home to be sure they are the same. If you have any medications that are different please contact us immediately at [phone]. Also review your allergies that we have listed, these may also include medications that you have not been able to tolerate, make sure everything listed is correct. If you have any allergies that are different please contact us immediately at [phone].

## 2021-03-12 NOTE — PROGRESS NOTES
Subjective:  Jaswinder Vides is here to discuss the following issues. She has a long history of asthma. Her symptoms have been worsening. She is short of breath more easily with activity. 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 She has fatigue on a regular daily basis which is sometimes severe. She sometimes has daytime sleepiness. She does not drive when sleepy. At night she sleeps very poorly. She states she has a light sleeper and awakens frequently. She does snore. She has previously had a hysterectomy and is concerned she may have hot flashes from menopause. She has diffuse arthritis and joint stiffness in the morning and a family history of severe arthritis. She has some joint swelling but no redness or warmth. No fever sweats or chills. She has longstanding hypotension. She has allergic rhinitis with ear congestion and standard antihistamines have not been helpful. She has vitamin D deficiency. No chest pain or chest pressure no edema in her legs  Social History     Tobacco Use   Smoking Status Never Smoker   Smokeless Tobacco Never Used   Allergies:     Latex and Other    Objective:  BP 98/66   Pulse 75   Temp 98.8 °F (37.1 °C) (Oral)   Ht 5' 5.75\" (1.67 m)   Wt 211 lb 12.8 oz (96.1 kg)   SpO2 97%   BMI 34.45 kg/m²    No acute distress, heart regular rate and rhythm without murmur, lungs clear to auscultation easy effort, abdomen soft nondistended, no clubbing or cyanosis no joint redness or swelling    Assessment:  1. Mild intermittent asthma without complication    2. Fatigue, unspecified type    3. Daytime somnolence    4. Sleep disorder    5. Postsurgical menopause    6. Arthritis    7. Hypotension, unspecified hypotension type    8. Allergic rhinitis, unspecified seasonality, unspecified trigger    9.  Vitamin D deficiency            Plan:  Pulmonology referral for worsening asthma and sleep disorder with daytime somnolence  She is exposed to fine glass particulate in the air at work which may worsen her breathing  Labs ordered  Advised to try OTC antihistamine with decongestant  She is off biotin which previously may have affected her blood test results  Further treatment pending results of above  Follow-up in 4 months or as needed  \"Healthy Family Handout\" provided  Avoid exposure to all tobacco products. Read and consider all information provided by the pharmacy regarding prescribed medications before use  Proper diet and weight management   Otherwise continue current treatment plan  Call or return if symptoms are not well controlled      All medical conditions for this patient are stable unless otherwise indicated    Jose A Mccoy MD    This note was transcribed using a voice recognition software system. Proper technique and careful oversight were used to increase transcription accuracy but inadvertent errors may be present.

## 2021-03-13 LAB
ANTI-NUCLEAR ANTIBODY (ANA): NEGATIVE
CYCLIC CITRULLINATED PEPTIDE ANTIBODY IGG: <0.5 U/ML (ref 0–2.9)

## 2021-03-15 LAB — ALDOSTERONE: 7.5 NG/DL

## 2021-07-28 ENCOUNTER — TELEPHONE (OUTPATIENT)
Dept: PULMONOLOGY | Age: 46
End: 2021-07-28

## 2021-07-28 NOTE — TELEPHONE ENCOUNTER
Brush teeth twice a day.  [facility] and list given  Will try to check audx here; Rambo has expressive speech delay and is getting weekly ST thru [facility]. He is signing and there are 2 different dialects of Asyrian language being spoken in home. Audx checked and normal bilaterally.     Patient Education     Well-Child Checkup: 3 Years     Use bedtime to bond with your child. Read a book together, talk about the day, or sing bedtime songs.     At the 3-year checkup, the healthcare provider will examine the child and ask how things are going at home. At this age, checkups become less frequent. So this may be your child’s last checkup for a while. This sheet describes some of what you can expect.  Development and milestones  The healthcare provider will ask questions about your child. He or she will observe your toddler to get an idea of your child’s development. By this visit, your child is likely doing some of the following:  · Using 2 to 4 word sentences  · Recognizing the names of body parts and the pointing to pictures in books  · Drawing or copying lines or circles  · Running and climbing  · Using one hand for more than the other eating and coloring  · Becoming more stubborn and testing limits  · Playing next to other children, but likely not interacting (this is called “parallel play”)  Feeding tips  Don’t worry if your child is picky about food. This is normal. How much your child eats at one meal or in one day is less important than the pattern over a few days or weeks. To help your 3-year-old eat well and develop healthy habits:  · Keep serving a variety of finger foods at meals. Be persistent with offering new foods. It often takes several tries before a child starts to like a new taste.  · If your child is hungry between meals, offer healthy foods. Cut-up vegetables and fruit, cheese, peanut butter, and crackers are good choices. Save snack foods such as chips or cookies for a special treat.  · Don’t  Patient cancelled appointment on 7/29/21 with Dr. Kleber Ryan for NPT-asthma; sleep. Reason: work    Patient did not reschedule appointment. Appointment rescheduled for na.      Last OV na force your child to eat. A child of this age will eat when hungry. He or she will likely eat more some days than others.  · Switch from whole milk to low-fat or nonfat milk. Ask the healthcare provider which is best for your child.  · Most of your child's calories should come from solid foods, not milk.  · Besides drinking milk, water is best. Limit fruit juice. It should be 100% juice and you may add water to it. Don’t give your toddler soda.  · Do not let your child walk around with food. This is a choking risk and can lead to overeating as the child gets older.  Hygiene tips  Recommendations include the following:  · Many 2-year-olds are not yet ready for potty training, but your child may start to show an interest within the next year. A child often signals that he or she is ready by regularly complaining about dirty diapers. If you have questions, ask the healthcare provider.  · Brush your child’s teeth twice a day. Use a small amount of fluoride toothpaste (no larger than a grain of rice) and a toothbrush designed for children.  · If you haven’t already done so, take your child to the dentist.  Sleeping tips  By 3 years of age, your child may be down to 1 nap a day and should be sleeping about 8 to 12 hours at night. If he or she sleeps more or less than this but seems healthy, it’s not a concern. To help your child sleep:  · Make sure your child gets enough physical activity during the day. This will help him or her sleep at night. Talk to the healthcare provider if you need ideas for active types of play.  · Follow a bedtime routine each night, such as brushing teeth followed by reading a book. Try to stick to the same bedtime each night.  · Do not put your child to bed with anything to drink.  · If getting your child to sleep through the night is a problem, ask the healthcare provider for tips.  Safety tips  Recommendations include the following:  · Don’t let your child play outdoors without supervision.  Teach caution around cars. Your child should always hold an adult’s hand when crossing the street or in a parking lot.  · Protect your toddler from falls with sturdy screens on windows and rodas at the tops and bottoms of staircases. Supervise the child on the stairs.  · If you have a swimming pool, it should be fenced. Rodas or doors leading to the pool should be closed and locked.  · At this age, children are very curious. They are likely to get into items that can be dangerous. Keep latches on cabinets and make sure products like cleansers and medicines are out of reach.  · Watch out for items that are small enough to choke on. As a rule, an item small enough to fit inside a toilet paper tube can cause a child to choke.  · Teach your child to be gentle and cautious with dogs, cats, and other animals. Always supervise the child around animals, even familiar family pets.  · In the car, always use a child safety seat. After your child turns 3 years old, it is appropriate to allow your child's seat to face forward while remaining in the back seat of the car. Always check the weight and height limits for your child's seat to make sure of proper use. All children younger than 13 should ride in the back seat. If you have questions, ask your child's healthcare provider.  · Keep this Poison Control phone number in an easy-to-see place, such as on the refrigerator: 333.214.9269.  Vaccines  Based on recommendations from the CDC, at this visit your child may receive the following vaccine:  · Hepatitis A  · Influenza (flu)  More talking  Over the next year, your child’s speech development will likely increase a lot. Each month, your child should learn new words and use longer sentences. You’ll notice the child starting to communicate more complex ideas and to carry on conversations. To help develop your child’s verbal skills:  · Read together often. Choose books that encourage participation, such as pointing at pictures or  touching the page.  · Help your child learn new words. Say the names of objects and describe your surroundings. Your child will  new words that he or she hears you say. (And don’t say words around your child that you don’t want repeated!)  · Make an effort to understand what your child is saying. At this age, children begin to communicate their needs and wants. Reinforce this communication by answering a question your child asks, or asking your own questions for the child to answer. Don't be concerned if you can't understand many of the words your child says. This is perfectly normal.  · Talk to the healthcare provider if you’re concerned about your child’s speech development.      Next checkup at: _______________________________     PARENT NOTES:  Date Last Reviewed: 12/1/2016  © 5980-3545 [employer]. [address] [place] [address], [place] [address]. All rights reserved. This information is not intended as a substitute for professional medical care. Always follow your healthcare professional's instructions.

## 2021-08-09 ENCOUNTER — APPOINTMENT (OUTPATIENT)
Dept: CT IMAGING | Age: 46
End: 2021-08-09
Payer: COMMERCIAL

## 2021-08-09 ENCOUNTER — OFFICE VISIT (OUTPATIENT)
Dept: FAMILY MEDICINE CLINIC | Age: 46
End: 2021-08-09
Payer: COMMERCIAL

## 2021-08-09 ENCOUNTER — HOSPITAL ENCOUNTER (EMERGENCY)
Age: 46
Discharge: ANOTHER HOSPITAL | End: 2021-08-09
Attending: EMERGENCY MEDICINE
Payer: COMMERCIAL

## 2021-08-09 VITALS
TEMPERATURE: 98.4 F | BODY MASS INDEX: 34.66 KG/M2 | SYSTOLIC BLOOD PRESSURE: 111 MMHG | HEART RATE: 63 BPM | DIASTOLIC BLOOD PRESSURE: 74 MMHG | WEIGHT: 213.13 LBS | OXYGEN SATURATION: 100 %

## 2021-08-09 VITALS
RESPIRATION RATE: 18 BRPM | HEART RATE: 67 BPM | OXYGEN SATURATION: 100 % | SYSTOLIC BLOOD PRESSURE: 111 MMHG | DIASTOLIC BLOOD PRESSURE: 67 MMHG | TEMPERATURE: 98.4 F

## 2021-08-09 DIAGNOSIS — G44.89 OTHER HEADACHE SYNDROME: Primary | ICD-10-CM

## 2021-08-09 DIAGNOSIS — H53.9 VISION CHANGES: ICD-10-CM

## 2021-08-09 DIAGNOSIS — G44.82 COITAL HEADACHE: Primary | ICD-10-CM

## 2021-08-09 LAB
ANION GAP SERPL CALCULATED.3IONS-SCNC: 9 MMOL/L (ref 3–16)
BASOPHILS ABSOLUTE: 0 K/UL (ref 0–0.2)
BASOPHILS RELATIVE PERCENT: 0.5 %
BUN BLDV-MCNC: 12 MG/DL (ref 7–20)
CALCIUM SERPL-MCNC: 9.4 MG/DL (ref 8.3–10.6)
CHLORIDE BLD-SCNC: 106 MMOL/L (ref 99–110)
CO2: 25 MMOL/L (ref 21–32)
CREAT SERPL-MCNC: 0.6 MG/DL (ref 0.6–1.1)
EKG ATRIAL RATE: 76 BPM
EKG DIAGNOSIS: NORMAL
EKG P AXIS: 45 DEGREES
EKG P-R INTERVAL: 126 MS
EKG Q-T INTERVAL: 376 MS
EKG QRS DURATION: 92 MS
EKG QTC CALCULATION (BAZETT): 423 MS
EKG R AXIS: -1 DEGREES
EKG T AXIS: 24 DEGREES
EKG VENTRICULAR RATE: 76 BPM
EOSINOPHILS ABSOLUTE: 0.1 K/UL (ref 0–0.6)
EOSINOPHILS RELATIVE PERCENT: 0.8 %
GFR AFRICAN AMERICAN: >60
GFR NON-AFRICAN AMERICAN: >60
GLUCOSE BLD-MCNC: 72 MG/DL (ref 70–99)
GLUCOSE BLD-MCNC: 80 MG/DL (ref 70–99)
HCT VFR BLD CALC: 39 % (ref 36–48)
HEMOGLOBIN: 13.2 G/DL (ref 12–16)
LYMPHOCYTES ABSOLUTE: 2.8 K/UL (ref 1–5.1)
LYMPHOCYTES RELATIVE PERCENT: 26.4 %
MCH RBC QN AUTO: 29.6 PG (ref 26–34)
MCHC RBC AUTO-ENTMCNC: 33.8 G/DL (ref 31–36)
MCV RBC AUTO: 87.7 FL (ref 80–100)
MONOCYTES ABSOLUTE: 0.8 K/UL (ref 0–1.3)
MONOCYTES RELATIVE PERCENT: 7.2 %
NEUTROPHILS ABSOLUTE: 6.9 K/UL (ref 1.7–7.7)
NEUTROPHILS RELATIVE PERCENT: 65.1 %
PDW BLD-RTO: 13.4 % (ref 12.4–15.4)
PERFORMED ON: NORMAL
PLATELET # BLD: 300 K/UL (ref 135–450)
PMV BLD AUTO: 7.8 FL (ref 5–10.5)
POTASSIUM REFLEX MAGNESIUM: 3.8 MMOL/L (ref 3.5–5.1)
RBC # BLD: 4.45 M/UL (ref 4–5.2)
SODIUM BLD-SCNC: 140 MMOL/L (ref 136–145)
TROPONIN: <0.01 NG/ML
WBC # BLD: 10.5 K/UL (ref 4–11)

## 2021-08-09 PROCEDURE — 70450 CT HEAD/BRAIN W/O DYE: CPT

## 2021-08-09 PROCEDURE — 36415 COLL VENOUS BLD VENIPUNCTURE: CPT

## 2021-08-09 PROCEDURE — 80048 BASIC METABOLIC PNL TOTAL CA: CPT

## 2021-08-09 PROCEDURE — 85025 COMPLETE CBC W/AUTO DIFF WBC: CPT

## 2021-08-09 PROCEDURE — 6360000002 HC RX W HCPCS: Performed by: EMERGENCY MEDICINE

## 2021-08-09 PROCEDURE — 99284 EMERGENCY DEPT VISIT MOD MDM: CPT

## 2021-08-09 PROCEDURE — 96374 THER/PROPH/DIAG INJ IV PUSH: CPT

## 2021-08-09 PROCEDURE — 84484 ASSAY OF TROPONIN QUANT: CPT

## 2021-08-09 PROCEDURE — 96375 TX/PRO/DX INJ NEW DRUG ADDON: CPT

## 2021-08-09 PROCEDURE — 6360000004 HC RX CONTRAST MEDICATION: Performed by: EMERGENCY MEDICINE

## 2021-08-09 PROCEDURE — 70498 CT ANGIOGRAPHY NECK: CPT

## 2021-08-09 PROCEDURE — 93010 ELECTROCARDIOGRAM REPORT: CPT | Performed by: INTERNAL MEDICINE

## 2021-08-09 PROCEDURE — 93005 ELECTROCARDIOGRAM TRACING: CPT | Performed by: EMERGENCY MEDICINE

## 2021-08-09 PROCEDURE — 99213 OFFICE O/P EST LOW 20 MIN: CPT | Performed by: NURSE PRACTITIONER

## 2021-08-09 RX ORDER — ONDANSETRON 2 MG/ML
4 INJECTION INTRAMUSCULAR; INTRAVENOUS ONCE
Status: COMPLETED | OUTPATIENT
Start: 2021-08-09 | End: 2021-08-09

## 2021-08-09 RX ORDER — FENTANYL CITRATE 50 UG/ML
25 INJECTION, SOLUTION INTRAMUSCULAR; INTRAVENOUS ONCE
Status: COMPLETED | OUTPATIENT
Start: 2021-08-09 | End: 2021-08-09

## 2021-08-09 RX ADMIN — FENTANYL CITRATE 25 MCG: 50 INJECTION, SOLUTION INTRAMUSCULAR; INTRAVENOUS at 19:07

## 2021-08-09 RX ADMIN — IOPAMIDOL 75 ML: 755 INJECTION, SOLUTION INTRAVENOUS at 18:39

## 2021-08-09 RX ADMIN — ONDANSETRON HYDROCHLORIDE 4 MG: 2 INJECTION, SOLUTION INTRAMUSCULAR; INTRAVENOUS at 19:07

## 2021-08-09 ASSESSMENT — ENCOUNTER SYMPTOMS
WHEEZING: 0
SHORTNESS OF BREATH: 0
COUGH: 0
VOMITING: 0
CHEST TIGHTNESS: 0
PHOTOPHOBIA: 1
SORE THROAT: 0
NAUSEA: 0
RHINORRHEA: 0
DIARRHEA: 0
ABDOMINAL PAIN: 0

## 2021-08-09 ASSESSMENT — PAIN SCALES - GENERAL: PAINLEVEL_OUTOF10: 2

## 2021-08-09 ASSESSMENT — PAIN DESCRIPTION - DESCRIPTORS: DESCRIPTORS: ACHING;CONSTANT

## 2021-08-09 ASSESSMENT — PAIN DESCRIPTION - LOCATION: LOCATION: HEAD

## 2021-08-09 NOTE — PATIENT INSTRUCTIONS
Please read the healthy family handout that you were given and share it with your family. Please compare this printed medication list with your medications at home to be sure they are the same. If you have any medications that are different please contact us immediately at [phone]. Also review your allergies that we have listed, these may also include medications that you have not been able to tolerate, make sure everything listed is correct. If you have any allergies that are different please contact us immediately at [phone].

## 2021-08-09 NOTE — PROGRESS NOTES
CHIEF COMPLAINT  Chief Complaint   Patient presents with    Headache    Other     pressure behind eyes    Hypertension        HPI   Dominguez Mark is a 41 y.o. female who presents to the office complaining of headache, vision changes, pressure behind eyes, and elevated blood pressure readings. Patient reports initial symptoms started early Sunday morning during intercourse. Patient reports feeling \"fuzzy \"throughout the day on Sunday. Patient reports that headache was \"excruciating at the time. \"  Patient reports that it lasted approximately 45 minutes. Patient reports feeling very off throughout the day. Patient reports that later on Sunday her and her significant other \"started to do things again and headache reoccurred. \"  Patient reports that she has noted some changes in her vision and blood pressure was elevated throughout the day. Patient reports that yesterday her blood pressure was 133/99. Patient denies any recent changes in medications, over-the-counter supplements, or medical illness. Patient reports that she has had pressure behind bilateral eyes. Patient reports that she was seen by [facility] earlier this year and was referred to a specialist however due to her work schedule she has not followed up yet. Patient denies any unilateral weakness, loss of bowel or bladder function, nausea, vomiting or diarrhea. Patient reports \"hearing white noise in her ears. \"  Patient also reports numbness and tingling in her fingertips on the left hand. Patient reports that the symptoms are new as of yesterday. No other complaints, modifying factors or associated symptoms. Nursing notes reviewed.    Past Medical History:   Diagnosis Date    Anxiety     Kidney stones     During pregnancy    Migraines     Pituitary adenoma ([facility] [address]     1/03 - Stable after 1 year     Past Surgical History:   Procedure Laterality Date    COLONOSCOPY      2011    CYST REMOVAL      knee    HYSTERECTOMY  12/17/2012 Family History   Problem Relation Age of Onset    Heart Disease Father         stents, MI, and now double bypass     Social History     Socioeconomic History    Marital status:      Spouse name: Not on file    Number of children: Not on file    Years of education: Not on file    Highest education level: Not on file   Occupational History    Not on file   Tobacco Use    Smoking status: Never Smoker    Smokeless tobacco: Never Used   Substance and Sexual Activity    Alcohol use: No    Drug use: No    Sexual activity: Yes     Partners: Male   Other Topics Concern    Not on file   Social History Narrative    Not on file     Social Determinants of Health     Financial Resource Strain:     Difficulty of Paying Living Expenses:    Food Insecurity:     Worried About Running Out of Food in the Last Year:     [address] in the Last Year:    Transportation Needs:     Lack of Transportation (Medical):      Lack of Transportation (Non-Medical):    Physical Activity:     Days of Exercise per Week:     Minutes of Exercise per Session:    Stress:     Feeling of Stress :    Social Connections:     Frequency of Communication with Friends and Family:     Frequency of Social Gatherings with Friends and Family:     Attends Rastafarian Services:     Active Member of Clubs or Organizations:     Attends Club or Organization Meetings:     Marital Status:    Intimate Partner Violence:     Fear of Current or Ex-Partner:     Emotionally Abused:     Physically Abused:     Sexually Abused:      Current Outpatient Medications   Medication Sig Dispense Refill    ondansetron (ZOFRAN-ODT) 4 MG disintegrating tablet Take 1 tablet by mouth 3 times daily as needed for Nausea or Vomiting (Patient not taking: Reported on 8/9/2021) 21 tablet 5    albuterol sulfate HFA (VENTOLIN HFA) 108 (90 Base) MCG/ACT inhaler Inhale 2 puffs into the lungs 4 times daily as needed for Wheezing (Patient not taking: Reported on 8/9/2021) 1 Inhaler 5    fluticasone-vilanterol (BREO ELLIPTA) 100-25 MCG/INH AEPB inhaler Inhale 1 puff into the lungs daily (Patient not taking: Reported on 8/9/2021) 1 each 5    albuterol sulfate HFA (PROAIR HFA) 108 (90 BASE) MCG/ACT inhaler Inhale 2 puffs into the lungs every 4 hours as needed 1 Inhaler 3     No current facility-administered medications for this visit. Allergies   Allergen Reactions    Latex     Other      bandaid adhesive       REVIEW OF SYSTEMS  Review of Systems   Constitutional: Negative for activity change, appetite change, chills and fever. HENT: Negative for congestion, rhinorrhea and sore throat. Eyes: Positive for photophobia and visual disturbance. Respiratory: Negative for cough, chest tightness, shortness of breath and wheezing. Cardiovascular: Negative for chest pain and leg swelling. Gastrointestinal: Negative for abdominal pain, diarrhea, nausea and vomiting. Genitourinary: Negative for dysuria, frequency, hematuria and urgency. Musculoskeletal: Negative for gait problem and myalgias. Skin: Negative for rash. Neurological: Positive for light-headedness, numbness and headaches. Negative for dizziness and weakness. Psychiatric/Behavioral: Negative for sleep disturbance. PHYSICAL EXAM  /74   Pulse 63   Temp 98.4 °F (36.9 °C) (Oral)   Wt 213 lb 2 oz (96.7 kg)   SpO2 100%   BMI 34.66 kg/m²   Physical Exam  Vitals reviewed. Constitutional:       General: She is not in acute distress. Appearance: Normal appearance. She is well-developed. She is not diaphoretic. HENT:      Head: Normocephalic and atraumatic. Right Ear: Tympanic membrane normal.      Left Ear: Tympanic membrane normal.      Nose: Nose normal.      Mouth/Throat:      Mouth: Mucous membranes are moist.      Pharynx: Oropharynx is clear. No oropharyngeal exudate or posterior oropharyngeal erythema. Eyes:      General:         Right eye: No discharge.          Left eye: No discharge. Pupils: Pupils are equal, round, and reactive to light. Neck:      Vascular: No JVD. Cardiovascular:      Rate and Rhythm: Normal rate and regular rhythm. Pulses: Normal pulses. Pulmonary:      Effort: Pulmonary effort is normal. No respiratory distress. Breath sounds: No stridor. No wheezing, rhonchi or rales. Chest:      Chest wall: No tenderness. Abdominal:      General: Bowel sounds are normal. There is no distension. Palpations: Abdomen is soft. Tenderness: There is no abdominal tenderness. There is no guarding. Musculoskeletal:         General: No swelling or deformity. Normal range of motion. Cervical back: Normal range of motion and neck supple. Skin:     General: Skin is warm and dry. Capillary Refill: Capillary refill takes less than 2 seconds. Coloration: Skin is not pale. Findings: No bruising, lesion or rash. Neurological:      General: No focal deficit present. Mental Status: She is alert and oriented to person, place, and time. Cranial Nerves: No cranial nerve deficit. Motor: No weakness. Psychiatric:         Mood and Affect: Mood normal.         Behavior: Behavior normal.       ASSESSMENT/PLAN:   1. Other headache syndrome  Patient presents the office for evaluation of headache, pressure behind eyes, elevated blood pressure readings onset yesterday. Patient reports an initial episode lasted approximately 45 minutes then reoccurred later in the evening. Patient reports that both episodes occurred when her and her significant other were having intercourse. Patient denies any previous or similar symptoms in the past.  Patient reports that when she checked her blood pressure yesterday was 133/99. Patient does not take any blood pressure medications or any medications on a daily basis. No new over-the-counter supplements or changes in medical history per patient.   Patient reports that she felt \"very off \"the remainder of yesterday and still little bit today. Patient reports having pressure behind her eyes and feeling difficult to focus. 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 Based on patient's symptoms and my concern I did recommend patient going to the ER immediately for prompt work-up. I did offer to call an ambulance however patient declined and called significant other for ride to ER. Patient verbalized and acknowledges with plan of care at this time. Patient aware of risks associated with private transport versus EMS. 2. Vision changes  See above #1         The note was completed using Dragon voice recognition transcription. Every effort was made to ensure accuracy; however, inadvertent  transcription errors may be present despite my best efforts to edit errors.     Yoanna Guerrero, APRN - CNP

## 2021-08-09 NOTE — ED NOTES
Dr. Pamela Bonner at the bedside to update pt with plan of care.      Kirsten Pappas RN  08/09/21 [id number]

## 2021-08-09 NOTE — ED NOTES
Dr. Ana Barrera at the bedside updated on plan of care for pt to go immediately to [facility] ED for STAT MRI. Report called to Annemarie Pompa ED Charge SAMEER.      Donnette Bence, RN  08/09/21 [id number]

## 2021-08-09 NOTE — ED PROVIDER NOTES
Emergency Department Encounter    Patient: Valeriano Rock  MRN: [id number]  : 1975  Date of Evaluation: 2021  ED Provider:  Stefan Camarena MD    Triage Chief Complaint:   Headache (Began saturday at 130 am while having sex, pt states that it started during orgasm and lasted approx 45 min, pt states that she woke up \"fuzzy\" yesterday. Pt states that she had sex again yesterday and experienced the same type of headache. + nausea at times and also mild photosensitivity)    [place]:  Valeriano Rock is a 41 y.o. female that presents to the ER for evaluation of a 48 -hour history of mid coitus severe headache, with acute onset during orgasm, followed by delayed headache, repeat coitus followed by repeat headache and nausea. Current headache is a 2 out of 10. No family history for subarachnoid hemorrhage or aneurysm. She does have a prior history of pituitary adenoma with no surgical intervention. Mild 2 out of 10 headache. No focal neurologic deficits otherwise. Possible very mild right lateral visual field blurring. No prior history of migraine headaches. No vomiting. No posterior neck pain. No anticoagulation.     ROS - see HPI, below listed is current ROS at time of my eval:  General:  No fevers, no chills, no weakness  Eyes:  No recent vison changes, no discharge  ENT:  No sore throat, no nasal congestion, no hearing changes  Cardiovascular:  No chest pain  Respiratory:  No shortness of breath  Gastrointestinal:  No pain, + nausea, no vomiting, no diarrhea  Musculoskeletal:  No muscle pain, no joint pain  Skin:  No rash, no pruritis, no easy bruising  Neurologic:  No speech problems, + headache, no extremity numbness, no extremity tingling, no extremity weakness, no neck pain or stiffness  Psychiatric:  No anxiety  Extremities:  no edema, no pain    Past Medical History:   Diagnosis Date    Anxiety     Kidney stones     During pregnancy    Migraines     Pituitary adenoma ([facility] [address]      - (Patient not taking: Reported on 8/9/2021) 21 tablet 5    albuterol sulfate HFA (VENTOLIN HFA) 108 (90 Base) MCG/ACT inhaler Inhale 2 puffs into the lungs 4 times daily as needed for Wheezing (Patient not taking: Reported on 8/9/2021) 1 Inhaler 5    fluticasone-vilanterol (BREO ELLIPTA) 100-25 MCG/INH AEPB inhaler Inhale 1 puff into the lungs daily (Patient not taking: Reported on 8/9/2021) 1 each 5    albuterol sulfate HFA (PROAIR HFA) 108 (90 BASE) MCG/ACT inhaler Inhale 2 puffs into the lungs every 4 hours as needed 1 Inhaler 3     Allergies   Allergen Reactions    Latex     Other      bandaid adhesive       Nursing Notes Reviewed    Physical Exam:  Triage VS:    ED Triage Vitals [08/09/21 2693]   Enc Vitals Group      BP (!) 146/95      Pulse 79      Resp 15      Temp 98.4 °F (36.9 °C)      Temp Source Oral      SpO2 100 %      Weight       Height       Head Circumference       Peak Flow       Pain Score       Pain Loc       Pain Edu? Excl. in [address]? My pulse ox interpretation is - normal    General appearance:  No acute distress. Skin:  Warm. Dry. Eye:  Extraocular movements intact. PERRL   Ears, nose, mouth and throat:  Oral mucosa moist   Neck:  Trachea midline. Extremity:  No swelling. Normal ROM     Heart:  Regular  Perfusion:  intact  Respiratory: Respirations nonlabored. Abdominal:   Non distended.              Neurological:  Alert and oriented times 3.  5 out of 5 motor strength in extremities, sensation intact to light touch in extremities, cranial nerves           grossly intact, visual fields intact, negative pronator drift, rapid alternating movements intact, possible very mild right lateral field blurring right superior lateral quadrant otherwise no other visual field deficit    I have reviewed and interpreted all of the currently available lab results from this visit (if applicable):  Results for orders placed or performed during the hospital encounter of 08/09/21   CBC Auto Differential   Result Value Ref Range    WBC 10.5 4.0 - 11.0 K/uL    RBC 4.45 4.00 - 5.20 M/uL    Hemoglobin 13.2 12.0 - 16.0 g/dL    Hematocrit 39.0 36.0 - 48.0 %    MCV 87.7 80.0 - 100.0 fL    MCH 29.6 26.0 - 34.0 pg    MCHC 33.8 31.0 - 36.0 g/dL    RDW 13.4 12.4 - 15.4 %    Platelets 477 494 - 561 K/uL    MPV 7.8 5.0 - 10.5 fL    Neutrophils % 65.1 %    Lymphocytes % 26.4 %    Monocytes % 7.2 %    Eosinophils % 0.8 %    Basophils % 0.5 %    Neutrophils Absolute 6.9 1.7 - 7.7 K/uL    Lymphocytes Absolute 2.8 1.0 - 5.1 K/uL    Monocytes Absolute 0.8 0.0 - 1.3 K/uL    Eosinophils Absolute 0.1 0.0 - 0.6 K/uL    Basophils Absolute 0.0 0.0 - 0.2 K/uL   Basic Metabolic Panel w/ Reflex to MG   Result Value Ref Range    Sodium 140 136 - 145 mmol/L    Potassium reflex Magnesium 3.8 3.5 - 5.1 mmol/L    Chloride 106 99 - 110 mmol/L    CO2 25 21 - 32 mmol/L    Anion Gap 9 3 - 16    Glucose 80 70 - 99 mg/dL    BUN 12 7 - 20 mg/dL    CREATININE 0.6 0.6 - 1.1 mg/dL    GFR Non-African American >60 >60    GFR African American >60 >60    Calcium 9.4 8.3 - 10.6 mg/dL   Troponin   Result Value Ref Range    Troponin <0.01 <0.01 ng/mL   POCT Glucose   Result Value Ref Range    POC Glucose 72 70 - 99 mg/dl    Performed on ACCU-CHEK    EKG 12 Lead   Result Value Ref Range    Ventricular Rate 76 BPM    Atrial Rate 76 BPM    P-R Interval 126 ms    QRS Duration 92 ms    Q-T Interval 376 ms    QTc Calculation (Bazett) 423 ms    P Axis 45 degrees    R Axis -1 degrees    T Axis 24 degrees    Diagnosis       Normal sinus rhythmLow voltage QRSBorderline ECGWhen compared with ECG of 22-MAR-2021 22:11, (unconfirmed)Sinus rhythm has replaced Atrial fibrillationVent.  rate has decreased BY  49 BPMQRS axis Shifted leftST no longer depressed in Anterior leadsNonspecific T wave abnormality, improved in Inferior leadsNonspecific T wave abnormality no longer evident in Lateral leads      Radiographs (if obtained):  Radiologist's Report Reviewed:  CT head and CT angio head and neck pending    MDM:  Patient presents ER for evaluation of the 8-hour history of mid to enterocolitis headache, without any prior history of migraine headache, she is very minimal potential subjective blurring in the right superior lateral quadrant of her right eye. CT angio head and neck revealed no aneurysm no thrombosis. CT head without contrast no subarachnoid hemorrhage. Consultation with Dr. Fernando Gann from [facility] interventional neurosurgery concurred with the need for MRI imaging to evaluate for possible delayed blood. She is a GCS of 15 with no focal deficit she ambulates well. She will self transport with family to [facility] emergency department with whom I spoke with Dr. Mera Mayo, for emergent MRI imaging to rule out subarachnoid blood. If this is normal the patient is stable for outpatient management and discharge. If this is abnormal she will be admitted for angiography. Patient verbalized understanding of disposition and management. Total critical care time provided today was 35 minutes. This excludes seperately billable procedures and family discussion time. Critical care time provided for obtaining history, conducting a physical exam, performing and monitoring interventions, ordering, collecting and interpreting tests, and establishing medical decision-making. There was a potential for life/limb threatening pathology requiring close evaluation and intervention with concern for patient decompensation. Clinical Impression:  1. Coital headache      Disposition referral (if applicable):  No follow-up provider specified. Disposition medications (if applicable):  New Prescriptions    No medications on file       Comment: Please note this report has been produced using speech recognition software and may contain errors related to that system including errors in grammar, punctuation, and spelling, as well as words and phrases that may be inappropriate.   Efforts were made to edit the dictations.       Gregoria Cross MD  57/40/20 Radha Cross MD  04/38/22 [id number]

## 2021-08-16 ENCOUNTER — OFFICE VISIT (OUTPATIENT)
Dept: FAMILY MEDICINE CLINIC | Age: 46
End: 2021-08-16
Payer: COMMERCIAL

## 2021-08-16 ENCOUNTER — TELEPHONE (OUTPATIENT)
Dept: FAMILY MEDICINE CLINIC | Age: 46
End: 2021-08-16

## 2021-08-16 VITALS — BODY MASS INDEX: 34.07 KG/M2 | WEIGHT: 209.5 LBS

## 2021-08-16 DIAGNOSIS — G44.82 COITAL HEADACHE: Primary | ICD-10-CM

## 2021-08-16 DIAGNOSIS — G43.709 CHRONIC MIGRAINE WITHOUT AURA WITHOUT STATUS MIGRAINOSUS, NOT INTRACTABLE: ICD-10-CM

## 2021-08-16 PROCEDURE — 99213 OFFICE O/P EST LOW 20 MIN: CPT | Performed by: NURSE PRACTITIONER

## 2021-08-16 ASSESSMENT — ENCOUNTER SYMPTOMS
PHOTOPHOBIA: 1
RHINORRHEA: 0
ABDOMINAL PAIN: 0
COUGH: 0
ABDOMINAL DISTENTION: 0
SHORTNESS OF BREATH: 0
WHEEZING: 0
SORE THROAT: 0
CHEST TIGHTNESS: 0
NAUSEA: 1
VOMITING: 0
DIARRHEA: 0

## 2021-08-16 NOTE — TELEPHONE ENCOUNTER
Patient was seen last week for headache, vision changes, pressure behind eyes, and elevated blood pressure readings. Patient reports initial symptoms started early Sunday morning during intercourse. Patient reports feeling \"fuzzy \"throughout the day on Sunday. Patient reports that headache was \"excruciating at the time. \"  She was sent to ER due to history of narrow angle. She was then transferred to [facility] where she had MRI Brain and MRI Angio. These episodes occurred during intercourse. Saturday nightshe had another episode during intercourse, yesterday the headache was still there nauseating and still bad today. Patient needs a late appointment are you able to add on tomorrow?

## 2021-08-16 NOTE — PROGRESS NOTES
CHIEF COMPLAINT  Chief Complaint   Patient presents with    Headache        HPI   Antoine Grant is a 41 y.o. female who presents to the office complaining of reoccurring headache. Patient was seen and evaluated initially 8/9/2021 where I referred her to the emergency department for prompt evaluation. Patient reports that she was evaluated in the emergency department with multiple imaging and discharged home. Patient reports that symptoms did improve until Saturday. Patient reports that headache returned during/after intercourse. Patient reports that symptoms have persisted. Patient denies any new or worsening vision abnormalities, lightheadedness, nausea, vomiting, diarrhea. Patient denies any fever or chills. Patient reports known history of chronic migraines and has been on medications in the past but on sure of what they were. No unilateral weakness, numbness or tingling in extremities. Patient denies any fever, cough, congestion or chills. She reports taking ibuprofen for symptomatic relief. No other complaints, modifying factors or associated symptoms. Nursing notes reviewed. Past Medical History:   Diagnosis Date    Anxiety     Kidney stones     During pregnancy    Migraines     Pituitary adenoma ([facility] [address]     1/03 - Stable after 1 year     Past Surgical History:   Procedure Laterality Date    COLONOSCOPY      2011    CYST REMOVAL      knee    HYSTERECTOMY  12/17/2012     Family History   Problem Relation Age of Onset    Heart Disease Father         stents, MI, and now double bypass     Social History     Socioeconomic History    Marital status:      Spouse name: Not on file    Number of children: Not on file    Years of education: Not on file    Highest education level: Not on file   Occupational History    Not on file   Tobacco Use    Smoking status: Never Smoker    Smokeless tobacco: Never Used   Substance and Sexual Activity    Alcohol use: No    Drug use:  No  Sexual activity: Yes     Partners: Male   Other Topics Concern    Not on file   Social History Narrative    Not on file     Social Determinants of Health     Financial Resource Strain:     Difficulty of Paying Living Expenses:    Food Insecurity:     Worried About Running Out of Food in the Last Year:     [address] in the Last Year:    Transportation Needs:     Lack of Transportation (Medical):  Lack of Transportation (Non-Medical):    Physical Activity:     Days of Exercise per Week:     Minutes of Exercise per Session:    Stress:     Feeling of Stress :    Social Connections:     Frequency of Communication with Friends and Family:     Frequency of Social Gatherings with Friends and Family:     Attends Judaism Services:     Active Member of Clubs or Organizations:     Attends Club or Organization Meetings:     Marital Status:    Intimate Partner Violence:     Fear of Current or Ex-Partner:     Emotionally Abused:     Physically Abused:     Sexually Abused:      Current Outpatient Medications   Medication Sig Dispense Refill    ondansetron (ZOFRAN-ODT) 4 MG disintegrating tablet Take 1 tablet by mouth 3 times daily as needed for Nausea or Vomiting (Patient not taking: Reported on 8/9/2021) 21 tablet 5    fluticasone-vilanterol (BREO ELLIPTA) 100-25 MCG/INH AEPB inhaler Inhale 1 puff into the lungs daily (Patient not taking: Reported on 8/9/2021) 1 each 5    albuterol sulfate HFA (PROAIR HFA) 108 (90 BASE) MCG/ACT inhaler Inhale 2 puffs into the lungs every 4 hours as needed 1 Inhaler 3     No current facility-administered medications for this visit. Allergies   Allergen Reactions    Latex     Other      bandaid adhesive       REVIEW OF SYSTEMS  Review of Systems   Constitutional: Negative for activity change, appetite change, chills and fever. HENT: Negative for congestion, rhinorrhea and sore throat. Eyes: Positive for photophobia. Negative for visual disturbance. Respiratory: Negative for cough, chest tightness, shortness of breath and wheezing. Cardiovascular: Negative for chest pain and leg swelling. Gastrointestinal: Positive for nausea. Negative for abdominal distention, abdominal pain, diarrhea and vomiting. Genitourinary: Negative for dysuria, frequency, hematuria and urgency. Musculoskeletal: Negative for gait problem and myalgias. Skin: Negative for rash. Neurological: Positive for headaches. Negative for dizziness, weakness, light-headedness and numbness. Psychiatric/Behavioral: Negative for sleep disturbance. PHYSICAL EXAM  Wt 209 lb 8 oz (95 kg)   BMI 34.07 kg/m²   Physical Exam  Vitals reviewed. Constitutional:       General: She is not in acute distress. Appearance: Normal appearance. She is well-developed. She is not diaphoretic. HENT:      Head: Normocephalic and atraumatic. Right Ear: Tympanic membrane normal.      Left Ear: Tympanic membrane normal.      Nose: Nose normal.      Mouth/Throat:      Mouth: Mucous membranes are moist.      Pharynx: Oropharynx is clear. No oropharyngeal exudate or posterior oropharyngeal erythema. Eyes:      General:         Right eye: No discharge. Left eye: No discharge. Pupils: Pupils are equal, round, and reactive to light. Neck:      Vascular: No JVD. Cardiovascular:      Rate and Rhythm: Normal rate and regular rhythm. Pulses: Normal pulses. Pulmonary:      Effort: Pulmonary effort is normal. No respiratory distress. Breath sounds: No stridor. No wheezing, rhonchi or rales. Chest:      Chest wall: No tenderness. Abdominal:      General: Bowel sounds are normal. There is no distension. Palpations: Abdomen is soft. Tenderness: There is no abdominal tenderness. There is no guarding. Musculoskeletal:         General: No swelling or deformity. Normal range of motion. Cervical back: Normal range of motion and neck supple.    Skin: abnormality. CTA HEAD NECK W CONTRAST    Result Date: 8/9/2021  EXAMINATION: CTA OF THE HEAD AND NECK WITH CONTRAST 8/9/2021 6:23 pm: TECHNIQUE: CTA of the head and neck was performed with the administration of intravenous contrast. Multiplanar reformatted images are provided for review. MIP images are provided for review. Stenosis of the internal carotid arteries measured using NASCET criteria. Dose modulation, iterative reconstruction, and/or weight based adjustment of the mA/kV was utilized to reduce the radiation dose to as low as reasonably achievable. COMPARISON: None. HISTORY: ORDERING SYSTEM PROVIDED HISTORY: ha, sudden onset, mid coitus, ro aneurysm, sah TECHNOLOGIST PROVIDED HISTORY: Reason for exam:->ha, sudden onset, mid coitus, ro aneurysm, sah Decision Support Exception - unselect if not a suspected or confirmed emergency medical condition->Emergency Medical Condition (MA) Reason for Exam: posterior ha since sat night/sun morning, no trauma, hx migraines, hx tia with right sided symptoms/reolved x 6 months afterward, hx pituitary tumor which didnt require intervention Acuity: Acute Type of Exam: Initial FINDINGS: CTA NECK: AORTIC ARCH/ARCH VESSELS: No dissection or arterial injury. No significant stenosis of the brachiocephalic or subclavian arteries. CAROTID ARTERIES: No dissection, arterial injury, or hemodynamically significant stenosis by NASCET criteria. VERTEBRAL ARTERIES: No dissection, arterial injury, or significant stenosis. SOFT TISSUES: The lung apices are clear. No cervical or superior mediastinal lymphadenopathy. The larynx and pharynx are unremarkable. No acute abnormality of the salivary and thyroid glands. BONES: No acute osseous abnormality. CTA HEAD: ANTERIOR CIRCULATION: No significant stenosis of the intracranial internal carotid, anterior cerebral, or middle cerebral arteries. No aneurysm.  POSTERIOR CIRCULATION: No significant stenosis of the vertebral, basilar, or verbalized and acknowledges with plan of care at this time. - Janice Vergara MD, Neurology, [facility] [place]    2. Chronic migraine without aura without status migrainosus, not intractable  See above #1.  - RENARD Huerta MD, Neurology, [facility] [place]         The note was completed using Dragon voice recognition transcription. Every effort was made to ensure accuracy; however, inadvertent  transcription errors may be present despite my best efforts to edit errors.     SHELBY Martinez - CNP

## 2021-08-16 NOTE — LETTER
[address]  [address] [facility] [place] [address]  Phone: [phone]  Fax: [phone]    SHELBY Marina CNP        August 16, 2021     Patient: Dominguez Mark   YOB: 1975   Date of Visit: 8/16/2021       To Whom it May Concern:    Enedina Hsu was seen in my clinic on 8/16/2021. She may return to work on 8/17/21. If you have any questions or concerns, please don't hesitate to call.     Sincerely,     SHELBY Marina CNP

## 2021-08-16 NOTE — PATIENT INSTRUCTIONS
Please read the healthy family handout that you were given and share it with your family. Please compare this printed medication list with your medications at home to be sure they are the same. If you have any medications that are different please contact us immediately at [phone]. Also review your allergies that we have listed, these may also include medications that you have not been able to tolerate, make sure everything listed is correct. If you have any allergies that are different please contact us immediately at [phone].

## 2021-11-08 DIAGNOSIS — Z20.822 SUSPECTED COVID-19 VIRUS INFECTION: Primary | ICD-10-CM

## 2021-11-10 ENCOUNTER — OFFICE VISIT (OUTPATIENT)
Dept: FAMILY MEDICINE CLINIC | Age: 46
End: 2021-11-10
Payer: COMMERCIAL

## 2021-11-10 VITALS
BODY MASS INDEX: 35.19 KG/M2 | TEMPERATURE: 98.3 F | DIASTOLIC BLOOD PRESSURE: 75 MMHG | OXYGEN SATURATION: 99 % | WEIGHT: 216.4 LBS | SYSTOLIC BLOOD PRESSURE: 113 MMHG | HEART RATE: 64 BPM

## 2021-11-10 DIAGNOSIS — B37.31 VAGINAL YEAST INFECTION: ICD-10-CM

## 2021-11-10 DIAGNOSIS — H66.003 ACUTE SUPPURATIVE OTITIS MEDIA OF BOTH EARS WITHOUT SPONTANEOUS RUPTURE OF TYMPANIC MEMBRANES, RECURRENCE NOT SPECIFIED: Primary | ICD-10-CM

## 2021-11-10 PROCEDURE — 99213 OFFICE O/P EST LOW 20 MIN: CPT | Performed by: NURSE PRACTITIONER

## 2021-11-10 RX ORDER — FLUCONAZOLE 150 MG/1
150 TABLET ORAL
Qty: 4 TABLET | Refills: 0 | Status: SHIPPED | OUTPATIENT
Start: 2021-11-10 | End: 2021-11-20

## 2021-11-10 RX ORDER — AMOXICILLIN AND CLAVULANATE POTASSIUM 875; 125 MG/1; MG/1
1 TABLET, FILM COATED ORAL 2 TIMES DAILY
Qty: 20 TABLET | Refills: 0 | Status: SHIPPED | OUTPATIENT
Start: 2021-11-10 | End: 2021-11-20

## 2021-11-10 ASSESSMENT — ENCOUNTER SYMPTOMS
RESPIRATORY NEGATIVE: 1
EYES NEGATIVE: 1
SORE THROAT: 1
GASTROINTESTINAL NEGATIVE: 1

## 2021-11-10 NOTE — PATIENT INSTRUCTIONS
Please read the healthy family handout that you were given and share it with your family. Please compare this printed medication list with your medications at home to be sure they are the same. If you have any medications that are different please contact us immediately at [phone]. Also review your allergies that we have listed, these may also include medications that you have not been able to tolerate, make sure everything listed is correct. If you have any allergies that are different please contact us immediately at [phone]. Patient Education        Ear Infection (Otitis Media): Care Instructions  Overview     An ear infection may start with a cold and affect the middle ear (otitis media). It can hurt a lot. Most ear infections clear up on their own in a couple of days and do not need antibiotics. Also, antibiotics do not work against viruses, which may be the cause of your infection. Regular doses of pain relievers are the best way to reduce your fever and help you feel better. Follow-up care is a key part of your treatment and safety. Be sure to make and go to all appointments, and call your doctor if you are having problems. It's also a good idea to know your test results and keep a list of the medicines you take. How can you care for yourself at home? · Take pain medicines exactly as directed. ? If the doctor gave you a prescription medicine for pain, take it as prescribed. ? If you are not taking a prescription pain medicine, take an over-the-counter medicine, such as acetaminophen (Tylenol), ibuprofen (Advil, Motrin), or naproxen (Aleve). Read and follow all instructions on the label. ? Do not take two or more pain medicines at the same time unless the doctor told you to. Many pain medicines have acetaminophen, which is Tylenol. Too much acetaminophen (Tylenol) can be harmful. · Plan to take a full dose of pain reliever before bedtime.  Getting enough sleep will help you get better. · Try a warm, moist washcloth on the ear. It may help relieve pain. · If your doctor prescribed antibiotics, take them as directed. Do not stop taking them just because you feel better. You need to take the full course of antibiotics. When should you call for help? Call your doctor now or seek immediate medical care if:    · You have new or increasing ear pain.     · You have new or increasing pus or blood draining from your ear.     · You have a fever with a stiff neck or a severe headache. Watch closely for changes in your health, and be sure to contact your doctor if:    · You have new or worse symptoms.     · You are not getting better after taking an antibiotic for 2 days. Where can you learn more? Go to [email or web address] and sign in to your [employer] account. Enter Y875 in the [employer] box to learn more about \"Ear Infection (Otitis Media): Care Instructions. \"     If you do not have an account, please click on the \"Sign Up Now\" link. Current as of: December 2, 2020               Content Version: 13.0  © 2006-2021 [employer]. Care instructions adapted under license by [facility] ([facility]). If you have questions about a medical condition or this instruction, always ask your healthcare professional. [address] any warranty or liability for your use of this information. Patient Education        amoxicillin and clavulanate potassium  Pronunciation:  am OK i JOHANA in [address] ate oscar TAS ee um  Brand:  Augmentin  What is the most important information I should know about amoxicillin and clavulanate potassium? You should not use this medicine if you have severe kidney disease, if you have had liver problems or jaundice while taking amoxicillin and clavulanate potassium, or if you are allergic to any penicillin or cephalosporin antibiotic, such as Amoxil, Ceftin, Cefzil, Moxatag, Omnicef, and others.   What is amoxicillin the pill in half and take both halves one at a time. Tell your doctor if you have trouble swallowing a whole or half pill. You must chew the chewable tablet before you swallow it. Shake the oral suspension (liquid) before you measure a dose. Use the dosing syringe provided, or use a medicine dose-measuring device (not a kitchen spoon). This medicine can affect the results of certain medical tests. Tell any doctor who treats you that you are using amoxicillin and clavulanate potassium. Use this medicine for the full prescribed length of time, even if your symptoms quickly improve. Skipping doses can increase your risk of infection that is resistant to medication. Amoxicillin and clavulanate potassium will not treat a viral infection such as the flu or a common cold. Store the tablets at room temperature away from moisture and heat. Store the liquid  in the refrigerator. Throw away any unused liquid after 10 days. What happens if I miss a dose? Take the medicine as soon as you can, but skip the missed dose if it is almost time for your next dose. Do not take two doses at one time. What happens if I overdose? Seek emergency medical attention or call the Poison Help line at 1-359.950.7210. Overdose can cause nausea, vomiting, stomach pain, diarrhea, skin rash, drowsiness, hyperactivity, and decreased urination. What should I avoid while taking amoxicillin and clavulanate potassium? Avoid taking this medicine together with or just after eating a high-fat meal. This will make it harder for your body to absorb the medication. Antibiotic medicines can cause diarrhea, which may be a sign of a new infection. If you have diarrhea that is watery or bloody, call your doctor before using anti-diarrhea medicine. What are the possible side effects of amoxicillin and clavulanate potassium?   Get emergency medical help if you have signs of an allergic reaction (hives, difficult breathing, swelling in your face or throat) or a severe skin reaction (fever, sore throat, burning eyes, skin pain, red or purple skin rash with blistering and peeling). Call your doctor at once if you have:  · severe stomach pain, diarrhea that is watery or bloody (even if it occurs months after your last dose);  · pale or yellowed skin, dark colored urine, fever, confusion or weakness;  · loss of appetite, upper stomach pain;  · little or no urination; or  · easy bruising or bleeding. Common side effects may include:  · nausea, vomiting; diarrhea;  · rash, itching;  · vaginal itching or discharge; or  · diaper rash. This is not a complete list of side effects and others may occur. Call your doctor for medical advice about side effects. You may report side effects to FDA at 3-312-FDA-9518. What other drugs will affect amoxicillin and clavulanate potassium? Tell your doctor about all your other medicines, especially:  · allopurinol;  · probenecid; or  · a blood thinner --warfarin, Coumadin, Jantoven. This list is not complete. Other drugs may affect amoxicillin and clavulanate potassium, including prescription and over-the-counter medicines, vitamins, and herbal products. Not all possible drug interactions are listed here. Where can I get more information? Your pharmacist can provide more information about amoxicillin and clavulanate potassium. Remember, keep this and all other medicines out of the reach of children, never share your medicines with others, and use this medication only for the indication prescribed. Every effort has been made to ensure that the information provided by [address] is accurate, up-to-date, and complete, but no guarantee is made to that effect. Drug information contained herein may be time sensitive.  [employer] information has been compiled for use by healthcare practitioners and consumers in the [place] and therefore [employer] does not warrant that uses outside of the [place] are

## 2021-11-10 NOTE — PROGRESS NOTES
Subjective:      Patient ID: Matthew Hull is a 41 y.o. female. Chief Complaint   Patient presents with    Otalgia        HPI    Patient presents today with headache, sore throat, post nasal drip and bilat ear pain (R>L) X 2 weeks. Patient reports she had COVID 1 months ago however recovered from the COVID symptoms she had. Patient denies fever, chills, sinus pain/pressure. Review of Systems   Constitutional: Negative for appetite change, chills and fever. HENT: Positive for congestion, ear pain and sore throat. Eyes: Negative. Respiratory: Negative. Gastrointestinal: Negative. Endocrine: Negative. Musculoskeletal: Negative. Neurological: Negative. Psychiatric/Behavioral: Negative. Patient Active Problem List   Diagnosis    Chronic constipation    Anxiety    Vertigo    Back pain    Chronic migraine without aura without status migrainosus, not intractable    Primary insomnia    Gastroesophageal reflux disease without esophagitis    Mild intermittent asthma without complication    Bipolar disorder, current episode mixed, mild (HCC)    Medication management    Closed fracture of right distal radius    Kidney stone    Vitamin D deficiency       Outpatient Medications Marked as Taking for the 11/10/21 encounter (Office Visit) with SHELBY Huber CNP   Medication Sig Dispense Refill    albuterol sulfate HFA (PROAIR HFA) 108 (90 BASE) MCG/ACT inhaler Inhale 2 puffs into the lungs every 4 hours as needed 1 Inhaler 3       Allergies   Allergen Reactions    Latex     Other      bandaid adhesive       Social History     Tobacco Use    Smoking status: Never Smoker    Smokeless tobacco: Never Used   Substance Use Topics    Alcohol use: No       Objective:   /75   Pulse 64   Temp 98.3 °F (36.8 °C) (Oral)   Wt 216 lb 6.4 oz (98.2 kg)   SpO2 99%   BMI 35.19 kg/m²     Physical Exam  Vitals and nursing note reviewed.    Constitutional:       General: She is not in acute distress. Appearance: Normal appearance. She is well-developed and well-groomed. She is not ill-appearing or toxic-appearing. HENT:      Head: Normocephalic and atraumatic. Right Ear: Tympanic membrane is erythematous and bulging. Left Ear: Tympanic membrane is erythematous and bulging. Nose: Nose normal.      Mouth/Throat:      Lips: Pink. Mouth: Mucous membranes are moist.      Pharynx: Oropharynx is clear. Eyes:      Extraocular Movements: Extraocular movements intact. Conjunctiva/sclera: Conjunctivae normal.      Pupils: Pupils are equal, round, and reactive to light. Cardiovascular:      Rate and Rhythm: Normal rate and regular rhythm. Pulses: Normal pulses. Heart sounds: Normal heart sounds. Pulmonary:      Effort: Pulmonary effort is normal. No accessory muscle usage or respiratory distress. Breath sounds: Normal breath sounds. Abdominal:      General: Bowel sounds are normal.      Palpations: Abdomen is soft. Musculoskeletal:      Cervical back: Normal range of motion and neck supple. Right lower leg: No edema. Left lower leg: No edema. Skin:     General: Skin is warm and dry. Findings: No rash. Neurological:      General: No focal deficit present. Mental Status: She is alert and oriented to person, place, and time. Mental status is at baseline. Psychiatric:         Attention and Perception: Attention normal.         Mood and Affect: Mood normal.         Speech: Speech normal.         Behavior: Behavior normal. Behavior is cooperative. Assessment/Plan:   1. Acute suppurative otitis media of both ears without spontaneous rupture of tympanic membranes, recurrence not specified  Patient presents today with a 2-week history of bilateral ear pain, right greater than left, headache, sore throat and postnasal drip. Patient denies fever, chills, sinus pain or pressure and was negative for Covid.   Patient reports she did have Covid 1 month ago and recovered. On exam noted bilateral TM erythema and bulging, right greater than left. Given duration of symptoms recommend treatment as below. Advised to drink plenty of fluids, take antibiotics as prescribed patient to follow-up if no better worsening of symptoms. Patient verbalized understanding agreeable to plan. - amoxicillin-clavulanate (AUGMENTIN) 875-125 MG per tablet; Take 1 tablet by mouth 2 times daily for 10 days  Dispense: 20 tablet; Refill: 0    2. Vaginal yeast infection  Patient also requests treatment for vaginal yeast infection when she commonly has with oral antibiotics. Recommend treatment as below. - fluconazole (DIFLUCAN) 150 MG tablet; Take 1 tablet by mouth every 72 hours for 4 doses  Dispense: 4 tablet;  Refill: 0

## 2021-11-10 NOTE — LETTER
[address] [id number]  Phone: [phone]  Fax: [phone]    Jazmine Salazar CNP        November 10, 2021     Patient: Chasity Tillman   YOB: 1975   Date of Visit: 11/10/2021       To Whom it May Concern:    Tank Teague was seen in my clinic on 11/10/2021. Please excuse her from work on 11/08/2021. If you have any questions or concerns, please don't hesitate to call.     Sincerely,         SHELBY Martinez CNP

## 2021-11-23 ENCOUNTER — TELEPHONE (OUTPATIENT)
Dept: FAMILY MEDICINE CLINIC | Age: 46
End: 2021-11-23

## 2021-11-23 NOTE — TELEPHONE ENCOUNTER
After speaking with the patient she said it is not thrush. Advised patient should be seen.  Appt given for 11-24-21

## 2021-11-23 NOTE — TELEPHONE ENCOUNTER
Has patient had these symptoms before? Unfortunately is difficult to prescribe medications if she has not had these symptoms before and not being able to visualize lesions and sores in her mouth. I would recommend going to nearby urgent care for evaluation.

## 2021-11-24 ENCOUNTER — OFFICE VISIT (OUTPATIENT)
Dept: FAMILY MEDICINE CLINIC | Age: 46
End: 2021-11-24
Payer: COMMERCIAL

## 2021-11-24 VITALS
DIASTOLIC BLOOD PRESSURE: 73 MMHG | BODY MASS INDEX: 35.84 KG/M2 | WEIGHT: 220.4 LBS | SYSTOLIC BLOOD PRESSURE: 111 MMHG | HEART RATE: 74 BPM | OXYGEN SATURATION: 98 % | TEMPERATURE: 98.3 F

## 2021-11-24 DIAGNOSIS — G89.29 CHRONIC EAR PAIN, BILATERAL: ICD-10-CM

## 2021-11-24 DIAGNOSIS — B00.1 HERPES LABIALIS: Primary | ICD-10-CM

## 2021-11-24 DIAGNOSIS — H60.313 ACUTE DIFFUSE OTITIS EXTERNA OF BOTH EARS: ICD-10-CM

## 2021-11-24 DIAGNOSIS — H92.03 CHRONIC EAR PAIN, BILATERAL: ICD-10-CM

## 2021-11-24 PROCEDURE — 99213 OFFICE O/P EST LOW 20 MIN: CPT | Performed by: NURSE PRACTITIONER

## 2021-11-24 RX ORDER — VALACYCLOVIR HYDROCHLORIDE 1 G/1
2000 TABLET, FILM COATED ORAL 2 TIMES DAILY
Qty: 4 TABLET | Refills: 0 | Status: SHIPPED | OUTPATIENT
Start: 2021-11-24 | End: 2021-11-25

## 2021-11-24 RX ORDER — ACETIC ACID 20.65 MG/ML
4 SOLUTION AURICULAR (OTIC) 3 TIMES DAILY
Qty: 15 ML | Refills: 0 | Status: SHIPPED | OUTPATIENT
Start: 2021-11-24 | End: 2021-12-04

## 2021-11-24 ASSESSMENT — ENCOUNTER SYMPTOMS
ALLERGIC/IMMUNOLOGIC NEGATIVE: 1
EYES NEGATIVE: 1
RESPIRATORY NEGATIVE: 1

## 2021-11-24 NOTE — PROGRESS NOTES
Subjective:      Patient ID: Cole Faith is a 41 y.o. female. Chief Complaint   Patient presents with    Other     [address]        HPI    Patient presents today with c/o oral mucus membranes redness and sore. Patient just completed a 10 day course Augmentin. Ear Pain  Patient complains of bilateral ear pain. Onset of symptoms was several days ago, gradually worsening since that time. She also notes no ear related symptoms. She does have a history of ear infections. She does not have a history of swimming. She has tried no tx. Review of Systems   Constitutional: Negative for appetite change, chills and fever. HENT: Positive for ear pain and mouth sores. Eyes: Negative. Respiratory: Negative. Cardiovascular: Negative. Endocrine: Negative. Genitourinary: Negative. Musculoskeletal: Negative. Allergic/Immunologic: Negative. Neurological: Negative. Psychiatric/Behavioral: Negative. Patient Active Problem List   Diagnosis    Chronic constipation    Anxiety    Vertigo    Back pain    Chronic migraine without aura without status migrainosus, not intractable    Primary insomnia    Gastroesophageal reflux disease without esophagitis    Mild intermittent asthma without complication    Bipolar disorder, current episode mixed, mild (HCC)    Medication management    Closed fracture of right distal radius    Kidney stone    Vitamin D deficiency       Outpatient Medications Marked as Taking for the 11/24/21 encounter (Office Visit) with RefSHELBY Villaseñor CNP   Medication Sig Dispense Refill    albuterol sulfate HFA (PROAIR HFA) 108 (90 BASE) MCG/ACT inhaler Inhale 2 puffs into the lungs every 4 hours as needed 1 Inhaler 3       Allergies   Allergen Reactions    Latex     Other      bandaid adhesive       Social History     Tobacco Use    Smoking status: Never Smoker    Smokeless tobacco: Never Used   Substance Use Topics    Alcohol use:  No Objective:   /73   Pulse 74   Temp 98.3 °F (36.8 °C) (Oral)   Wt 220 lb 6.4 oz (100 kg)   SpO2 98%   BMI 35.84 kg/m²     Physical Exam  Vitals and nursing note reviewed. Constitutional:       General: She is not in acute distress. Appearance: Normal appearance. She is well-developed and well-groomed. HENT:      Head: Normocephalic and atraumatic. Right Ear: Swelling and tenderness present. Left Ear: Swelling and tenderness present. Nose: Nose normal.      Mouth/Throat:      Mouth: Oral lesions present. Tongue: No lesions. Pharynx: Posterior oropharyngeal erythema present. No oropharyngeal exudate. Eyes:      Extraocular Movements: Extraocular movements intact. Conjunctiva/sclera: Conjunctivae normal.   Cardiovascular:      Rate and Rhythm: Normal rate and regular rhythm. Pulses: Normal pulses. Heart sounds: Normal heart sounds. Pulmonary:      Effort: Pulmonary effort is normal. No accessory muscle usage or respiratory distress. Breath sounds: Normal breath sounds. Abdominal:      General: Bowel sounds are normal.      Palpations: Abdomen is soft. Musculoskeletal:      Cervical back: Normal range of motion and neck supple. Right lower leg: No edema. Left lower leg: No edema. Skin:     General: Skin is warm and dry. Findings: No rash. Neurological:      General: No focal deficit present. Mental Status: She is alert and oriented to person, place, and time. Mental status is at baseline. Psychiatric:         Attention and Perception: Attention normal.         Mood and Affect: Mood normal.         Speech: Speech normal.         Behavior: Behavior normal. Behavior is cooperative. Assessment/Plan:   1. Herpes labialis  Patient presents today with complaints of bilateral ear pain and sores in mouth. Patient reports symptoms have been present over the past several days.   Patient was recently treated with a 10-day course of Augmentin for sinusitis. Patient also recently recovered from Covid. I suspect patient's symptoms are herpetic and nature and recommend treatment as below. Advised to follow-up if no better worsening of symptoms. Patient verbalized understanding agreeable to plan. - valACYclovir (VALTREX) 1 g tablet; Take 2 tablets by mouth 2 times daily for 1 day  Dispense: 4 tablet; Refill: 0    2. Acute diffuse otitis externa of both ears  Patient with bilateral diffuse otitis externa. Recommend treatment as below. Advised to follow-up if no better worsening of symptoms. - acetic acid (VOSOL) 2 % otic solution; Place 4 drops into both ears 3 times daily for 10 days  Dispense: 15 mL; Refill: 0    3. Chronic ear pain, bilateral  Patient with reports of chronic bilateral ear pain with recurrent acute otitis media and otitis externa. Recommend patient consider follow-up with ENT for further evaluation. Referral provided.   Patient states she will consider.  - acetic acid (VOSOL) 2 % otic solution; Place 4 drops into both ears 3 times daily for 10 days  Dispense: 15 mL; Refill: 0  - Marysol Howe MD, Otolaryngology, [facility]

## 2021-11-24 NOTE — PATIENT INSTRUCTIONS
Please read the healthy family handout that you were given and share it with your family. Please compare this printed medication list with your medications at home to be sure they are the same. If you have any medications that are different please contact us immediately at [phone]. Also review your allergies that we have listed, these may also include medications that you have not been able to tolerate, make sure everything listed is correct. If you have any allergies that are different please contact us immediately at [phone].

## 2022-01-04 ENCOUNTER — OFFICE VISIT (OUTPATIENT)
Dept: FAMILY MEDICINE CLINIC | Age: 47
End: 2022-01-04
Payer: COMMERCIAL

## 2022-01-04 VITALS
DIASTOLIC BLOOD PRESSURE: 70 MMHG | SYSTOLIC BLOOD PRESSURE: 104 MMHG | HEART RATE: 69 BPM | BODY MASS INDEX: 35.78 KG/M2 | TEMPERATURE: 99.3 F | OXYGEN SATURATION: 99 % | WEIGHT: 220 LBS

## 2022-01-04 DIAGNOSIS — B34.9 VIRAL SYNDROME: ICD-10-CM

## 2022-01-04 DIAGNOSIS — J02.9 SORETHROAT: ICD-10-CM

## 2022-01-04 DIAGNOSIS — R05.9 COUGH: Primary | ICD-10-CM

## 2022-01-04 LAB
INFLUENZA A ANTIGEN, POC: NEGATIVE
INFLUENZA B ANTIGEN, POC: NEGATIVE
S PYO AG THROAT QL: NORMAL

## 2022-01-04 PROCEDURE — 99213 OFFICE O/P EST LOW 20 MIN: CPT | Performed by: NURSE PRACTITIONER

## 2022-01-04 PROCEDURE — 87804 INFLUENZA ASSAY W/OPTIC: CPT | Performed by: NURSE PRACTITIONER

## 2022-01-04 PROCEDURE — 87880 STREP A ASSAY W/OPTIC: CPT | Performed by: NURSE PRACTITIONER

## 2022-01-04 ASSESSMENT — PATIENT HEALTH QUESTIONNAIRE - PHQ9
2. FEELING DOWN, DEPRESSED OR HOPELESS: 0
SUM OF ALL RESPONSES TO PHQ QUESTIONS 1-9: 0
1. LITTLE INTEREST OR PLEASURE IN DOING THINGS: 0
SUM OF ALL RESPONSES TO PHQ QUESTIONS 1-9: 0
SUM OF ALL RESPONSES TO PHQ9 QUESTIONS 1 & 2: 0

## 2022-01-04 ASSESSMENT — ENCOUNTER SYMPTOMS
SHORTNESS OF BREATH: 0
SORE THROAT: 1
SINUS PRESSURE: 0
SINUS PAIN: 0
DIARRHEA: 0
ABDOMINAL PAIN: 0
COUGH: 1
NAUSEA: 1
EYES NEGATIVE: 1
VOMITING: 0

## 2022-01-04 NOTE — PROGRESS NOTES
CHIEF COMPLAINT  Chief Complaint   Patient presents with    Headache    Cough    Pharyngitis    Otalgia        HPI   Mayra Tsai is a 40 y.o. female who presents to the office complaining of headache, intermittent productive cough, sore throat, ear pain, chest tightness and sinus congestion. Patient reports that symptom started midday on Friday. Patient denies any fever or chills. Patient reports taking home Covid test last night which was negative. Patient reports trying Mucinex max but made her feel worse. Patient denies any other treatment since then. Patient reports slight nausea but denies any vomiting, diarrhea, rash. No other complaints, modifying factors or associated symptoms. Nursing notes reviewed.    Past Medical History:   Diagnosis Date    Anxiety     Kidney stones     During pregnancy    Migraines     Pituitary adenoma ([facility] [address]     1/03 - Stable after 1 year     Past Surgical History:   Procedure Laterality Date    COLONOSCOPY      2011    CYST REMOVAL      knee    HYSTERECTOMY  12/17/2012     Family History   Problem Relation Age of Onset    Heart Disease Father         stents, MI, and now double bypass     Social History     Socioeconomic History    Marital status:      Spouse name: Not on file    Number of children: Not on file    Years of education: Not on file    Highest education level: Not on file   Occupational History    Not on file   Tobacco Use    Smoking status: Never Smoker    Smokeless tobacco: Never Used   Substance and Sexual Activity    Alcohol use: No    Drug use: No    Sexual activity: Yes     Partners: Male   Other Topics Concern    Not on file   Social History Narrative    Not on file     Social Determinants of Health     Financial Resource Strain:     Difficulty of Paying Living Expenses: Not on file   Food Insecurity:     Worried About Running Out of Food in the Last Year: Not on file    Anthony of Food in the Last Year: Not on file   Transportation Needs:     Lack of Transportation (Medical): Not on file    Lack of Transportation (Non-Medical): Not on file   Physical Activity:     Days of Exercise per Week: Not on file    Minutes of Exercise per Session: Not on file   Stress:     Feeling of Stress : Not on file   Social Connections:     Frequency of Communication with Friends and Family: Not on file    Frequency of Social Gatherings with Friends and Family: Not on file    Attends Sabianism Services: Not on file    Active Member of [address] [place] [address] [employer] or Organizations: Not on file    Attends Club or Organization Meetings: Not on file    Marital Status: Not on file   Intimate Partner Violence:     Fear of Current or Ex-Partner: Not on file    Emotionally Abused: Not on file    Physically Abused: Not on file    Sexually Abused: Not on file   Housing Stability:     Unable to Pay for Housing in the Last Year: Not on file    Number of [place] in the Last Year: Not on file    Unstable Housing in the Last Year: Not on file     Current Outpatient Medications   Medication Sig Dispense Refill    albuterol sulfate HFA (PROAIR HFA) 108 (90 BASE) MCG/ACT inhaler Inhale 2 puffs into the lungs every 4 hours as needed 1 Inhaler 3     No current facility-administered medications for this visit. Allergies   Allergen Reactions    Latex     Other      bandaid adhesive       REVIEW OF SYSTEMS  Review of Systems   Constitutional: Negative. HENT: Positive for congestion, ear pain and sore throat. Negative for sinus pressure and sinus pain. Eyes: Negative. Respiratory: Positive for cough. Negative for shortness of breath. Cardiovascular: Negative. Gastrointestinal: Positive for nausea. Negative for abdominal pain, diarrhea and vomiting. Genitourinary: Negative. Musculoskeletal: Positive for myalgias. Skin: Negative. Neurological: Positive for headaches. Psychiatric/Behavioral: Negative.          PHYSICAL EXAM  /70 Pulse 69   Temp 99.3 °F (37.4 °C) (Oral)   Wt 220 lb (99.8 kg)   SpO2 99%   BMI 35.78 kg/m²   Physical Exam  Constitutional:       Appearance: Normal appearance. HENT:      Head: Normocephalic. Right Ear: Tympanic membrane normal.      Left Ear: Tympanic membrane normal.      Nose: Congestion present. Mouth/Throat:      Mouth: Mucous membranes are moist.      Pharynx: Oropharynx is clear. No oropharyngeal exudate or posterior oropharyngeal erythema. Eyes:      Extraocular Movements: Extraocular movements intact. Conjunctiva/sclera: Conjunctivae normal.      Pupils: Pupils are equal, round, and reactive to light. Cardiovascular:      Rate and Rhythm: Normal rate. Pulses: Normal pulses. Pulmonary:      Effort: Pulmonary effort is normal.      Breath sounds: Normal breath sounds. Abdominal:      Palpations: Abdomen is soft. Tenderness: There is no guarding. Musculoskeletal:         General: Normal range of motion. Cervical back: Normal range of motion. Lymphadenopathy:      Cervical: No cervical adenopathy. Skin:     General: Skin is warm and dry. Capillary Refill: Capillary refill takes less than 2 seconds. Neurological:      Mental Status: She is alert and oriented to person, place, and time. ASSESSMENT/PLAN:   1. Viral syndrome/  Cough  Presents with complaints of intermittent productive cough, sore throat, ear pain, chest tightness, sinus congestion since Friday afternoon. Patient denies any fever or chills. Patient reports home Covid test yesterday negative. Patient does report some nausea but denies any vomiting or diarrhea. Patient reports did try to take Mucinex max however it did make her feel worse. Patient denies any other decongestants or over-the-counter medication. Rapid influenza and strep performed in the office were both negative. I did recommend that patient be retested for COVID-19.   Orders placed and faxed to [facility].  Patient aware. .  In the meantime I did recommend that patient drink plenty of fluids, alternate ibuprofen and Tylenol for pain and fever relief as well as over-the-counter decongestants for symptomatic control. Patient aware and verbalized with plan of care. - POCT Influenza A/B Antigen (BD Veritor)  - COVID-19; Future    2. Sorethroat  Above #1  - POCT rapid strep A  - COVID-19; Future         The note was completed using Dragon voice recognition transcription. Every effort was made to ensure accuracy; however, inadvertent  transcription errors may be present despite my best efforts to edit errors.     Tiffany King, SHELBY - CNP

## 2022-01-04 NOTE — PATIENT INSTRUCTIONS
Please read the healthy family handout that you were given and share it with your family. Please compare this printed medication list with your medications at home to be sure they are the same. If you have any medications that are different please contact us immediately at [phone]. Also review your allergies that we have listed, these may also include medications that you have not been able to tolerate, make sure everything listed is correct. If you have any allergies that are different please contact us immediately at [phone].
[phone]

## 2022-01-05 ENCOUNTER — TELEPHONE (OUTPATIENT)
Dept: FAMILY MEDICINE CLINIC | Age: 47
End: 2022-01-05

## 2022-01-05 NOTE — TELEPHONE ENCOUNTER
Patient still with cough and sorethroat today and was unable to go got work again today. Work note given.   Faxed to [facility] [phone]

## 2022-01-05 NOTE — LETTER
[address] [address]  Phone: [phone]  Fax: [phone]            January 5, 2022     Patient: Johana Drummond   YOB: 1975   Date of Visit: 1/4/22       To Whom it May Concern:    Daniel Jackman was seen in my clinic on 1/4/22. She is still with virus symptoms and is to be excused 1/4 and 1/5 She may return to work on 1/6/22. If you have any questions or concerns, please don't hesitate to call.     Sincerely,         Charles Godinez, CNP

## 2022-06-20 ENCOUNTER — TELEPHONE (OUTPATIENT)
Dept: FAMILY MEDICINE CLINIC | Age: 47
End: 2022-06-20

## 2022-06-20 DIAGNOSIS — M25.562 ACUTE PAIN OF BOTH KNEES: Primary | ICD-10-CM

## 2022-06-20 DIAGNOSIS — M25.561 ACUTE PAIN OF BOTH KNEES: Primary | ICD-10-CM

## 2022-06-20 NOTE — TELEPHONE ENCOUNTER
Patient is having trouble with her knees again, the last 2 weeks have gotten severe where she can't stand up on her own, the right is worse can't bare any weight on it. She had problems way back in 2012 and saw Dr Avi Kayser where she was found to have fracture of the tibial metphysis. She would like to see ortho again, but does she need to see you or can you just refer?

## 2022-06-21 SDOH — HEALTH STABILITY: PHYSICAL HEALTH
ON AVERAGE, HOW MANY DAYS PER WEEK DO YOU ENGAGE IN MODERATE TO STRENUOUS EXERCISE (LIKE A BRISK WALK)?: PATIENT DECLINED

## 2022-06-23 ENCOUNTER — OFFICE VISIT (OUTPATIENT)
Dept: ORTHOPEDIC SURGERY | Age: 47
End: 2022-06-23
Payer: COMMERCIAL

## 2022-06-23 VITALS — WEIGHT: 220 LBS | BODY MASS INDEX: 35.36 KG/M2 | HEIGHT: 66 IN

## 2022-06-23 DIAGNOSIS — M22.41 CHONDROMALACIA OF PATELLA, RIGHT: Primary | ICD-10-CM

## 2022-06-23 DIAGNOSIS — M25.561 RIGHT KNEE PAIN, UNSPECIFIED CHRONICITY: ICD-10-CM

## 2022-06-23 PROCEDURE — 99204 OFFICE O/P NEW MOD 45 MIN: CPT | Performed by: ORTHOPAEDIC SURGERY

## 2022-06-23 RX ORDER — MELOXICAM 15 MG/1
15 TABLET ORAL DAILY PRN
Qty: 30 TABLET | Refills: 0 | Status: SHIPPED | OUTPATIENT
Start: 2022-06-23

## 2022-06-23 NOTE — PROGRESS NOTES
ORTHOPAEDIC SURGERY H&P / CONSULTATION NOTE    Chief complaint:   Chief Complaint   Patient presents with    Knee Pain     right knee pain      History of present illness: The patient is a 40 y.o. female with subjective symptoms of right knee pain. The chief complaint is located at anterior aspect right knee. Duration of symptoms has been for 1 month. The severity of symptoms is rated at 7/10 pain on intake form. Patient states dull throbbing achy pain along the anterior aspect of the knee. She has pain with going up and down stairs and prolonged sit to stand. She states a previous injury to the right knee in 2011/12 she feels that the knee is not been the same since. She is attempted ibuprofen and heat without symptom resolution. This is infrequent with regard to anti-inflammatory use. Denies formal physical therapy denies injections. She denies gross instability in the knee. She states slight swelling    The patient has tried the below listed items prior to today's consultation for above listed chief complaint.     +   Over-the-counter anti-inflammatories/prescription medication anti-inflammatory. -   Physical therapy / guided home exercise program -     -   Previous corticosteroid injections    Past medical history:    Past Medical History:   Diagnosis Date    Anxiety     Kidney stones     During pregnancy    Migraines     Pituitary adenoma ([facility] [address]     1/03 - Stable after 1 year        Past surgical history:    Past Surgical History:   Procedure Laterality Date    COLONOSCOPY      2011    CYST REMOVAL      knee    HYSTERECTOMY (CERVIX STATUS UNKNOWN)  12/17/2012        Allergies:     Allergies   Allergen Reactions    Latex     Other      bandaid adhesive         Medications:   Current Outpatient Medications:     meloxicam (MOBIC) 15 MG tablet, Take 1 tablet by mouth daily as needed for Pain, Disp: 30 tablet, Rfl: 0    albuterol sulfate HFA (PROAIR HFA) 108 (90 BASE) MCG/ACT inhaler, Inhale 2 puffs into the lungs every 4 hours as needed, Disp: 1 Inhaler, Rfl: 3     Social history: Denies IV drug use. Social History     Socioeconomic History    Marital status:      Spouse name: Not on file    Number of children: Not on file    Years of education: Not on file    Highest education level: Not on file   Occupational History    Not on file   Tobacco Use    Smoking status: Never Smoker    Smokeless tobacco: Never Used   Substance and Sexual Activity    Alcohol use: No    Drug use: No    Sexual activity: Yes     Partners: Male   Other Topics Concern    Not on file   Social History Narrative    Not on file     Social Determinants of Health     Financial Resource Strain:     Difficulty of Paying Living Expenses: Not on file   Food Insecurity:     Worried About Running Out of Food in the Last Year: Not on file    Anthony of Food in the Last Year: Not on file   Transportation Needs:     Lack of Transportation (Medical): Not on file    Lack of Transportation (Non-Medical): Not on file   Physical Activity: Unknown    Days of Exercise per Week: Patient refused    Minutes of Exercise per Session: Not on file   Stress:     Feeling of Stress : Not on file   Social Connections:     Frequency of Communication with Friends and Family: Not on file    Frequency of Social Gatherings with Friends and Family: Not on file    Attends Mosque Services: Not on file    Active Member of Clubs or Organizations: Not on file    Attends Club or Organization Meetings: Not on file    Marital Status: Not on file   Intimate Partner Violence: Not At Risk    Fear of Current or Ex-Partner: No    Emotionally Abused: No    Physically Abused: No    Sexually Abused: No   Housing Stability:     Unable to Pay for Housing in the Last Year: Not on file    Number of [place] in the Last Year: Not on file    Unstable Housing in the Last Year: Not on file     Tobacco use.     Social History     Tobacco Use Smoking Status Never Smoker   Smokeless Tobacco Never Used     Employment: Noncontributory    Workers compensation claim: Noncontributory    Review of systems: Patient denies any fevers chills chest pain shortness of breath nausea vomiting significant weight loss any change in voiding or bowel movements. Patient denies any significant numbness or tingling at baseline as it relates to this presenting symptom/chief complaint. The patient denies any significant problems with skin or any significant allergies. Physical examination:  Body mass index is 35.51 kg/m². AAOx3, NCAT  EOMI  MMM  RR  Unlabored breathing, no wheezing  Skin intact BUE and BLE, warm and moist  Bilateral lower extremity examination specific to subjective symptoms  Exam Right Lower Extremity  Trace effusion, 0/118/0 active ROM (E/F/Lag), same P assive ROM (E/F/Lag), negative anterior Drawer, 1A Lachman,   negative posterior Drawer,   Stable varus/valgus at 0 and 30?,    none TTP Joint Line, negative Ghazal, positive Dawood's, positive medial lateral patellar facet tenderness  Skin intact throughout  5/5 IP Q H TA G EHL  SILT DP SP LP MP S S  +2 DP pulse    Diagnostic imaging:  MY READ:  4 view right knee 6/23/2022: Negative fracture. Slight patellofemoral joint space narrowing and medial joint space narrowing. Pertinent lab work: None     Diagnosis Orders   1. Chondromalacia of patella, right  meloxicam (MOBIC) 15 MG tablet    [facility] (Ortho [facility]   2. Right knee pain, unspecified chronicity  XR KNEE RIGHT (MIN 4 VIEWS)       Assessment and plan: 40 y.o. female with current subjective symptoms and physical exam findings with diagnostic imaging correlating to right knee patellar chondromalacia.   -Time of 16 minutes was spent coordinating and discussing the clinical findings, reviewing diagnostic imaging as indicated, coordinating care with prior notes review and current clinical encounter documentation as it pertains to the patient's presenting subjective symptoms and diagnoses. -I reviewed with the patient the imaging findings as well as clinical exam and  how it correlates to subjective symptoms.  -I had a pleasant discussion with the patient today. I reviewed with her consideration for conservative care treatment options. I reviewed with her patella chondromalacia findings on examination and also her subjective symptoms.  -Mobic 15 mg p.o. daily as needed pain has been prescribed and I recommended OTC Tylenol per bottle as needed discomfort  -Formal physical therapy has been prescribed to work on INNA knee reconditioning and strengthening to include quadriceps reconditioning  -Low impact activity to include elliptical stationary bike swimming and walking is been recommended  -Pending the results of conservative care, should the patient still have pain in 4 to 6 weeks time, she will contact the office for viscosupplementation injection therapy authorization insurance. She will follow-up thereafter to receive viscosupplementation injection therapy having reviewed with her the risk and benefits of doing so. -All questions answered to the patient's satisfaction and the patient expressed understanding and agreement with the above listed treatment plan  -Follow up in 4 to 6 weeks as needed per the above  -Thank you for the clinical consultation and allowing me to participate in the patient's care. Electronically signed by Melina Watkins MD on 6/23/22 at 2:21 PM OPAL Watkins MD       Orthopaedic Surgery-Sports Medicine        Disclaimer: This note was dictated with voice recognition software. Though review and correction are routinely performed, please contact the office/medical records for any errors requiring correction.

## 2022-07-12 ENCOUNTER — OFFICE VISIT (OUTPATIENT)
Dept: FAMILY MEDICINE CLINIC | Age: 47
End: 2022-07-12
Payer: COMMERCIAL

## 2022-07-12 VITALS
TEMPERATURE: 98.4 F | HEART RATE: 73 BPM | DIASTOLIC BLOOD PRESSURE: 78 MMHG | WEIGHT: 226.25 LBS | SYSTOLIC BLOOD PRESSURE: 117 MMHG | BODY MASS INDEX: 36.52 KG/M2 | OXYGEN SATURATION: 99 %

## 2022-07-12 DIAGNOSIS — M54.50 ACUTE LOW BACK PAIN WITHOUT SCIATICA, UNSPECIFIED BACK PAIN LATERALITY: ICD-10-CM

## 2022-07-12 DIAGNOSIS — R30.0 DYSURIA: Primary | ICD-10-CM

## 2022-07-12 DIAGNOSIS — R31.9 HEMATURIA, UNSPECIFIED TYPE: ICD-10-CM

## 2022-07-12 DIAGNOSIS — R10.9 ABDOMINAL PRESSURE: ICD-10-CM

## 2022-07-12 LAB
BILIRUBIN, POC: ABNORMAL
BLOOD URINE, POC: ABNORMAL
CLARITY, POC: CLEAR
COLOR, POC: YELLOW
GLUCOSE URINE, POC: ABNORMAL
KETONES, POC: ABNORMAL
LEUKOCYTE EST, POC: ABNORMAL
NITRITE, POC: ABNORMAL
PH, POC: 5.5
PROTEIN, POC: ABNORMAL
SPECIFIC GRAVITY, POC: >=1.03
UROBILINOGEN, POC: 0.2

## 2022-07-12 PROCEDURE — 81002 URINALYSIS NONAUTO W/O SCOPE: CPT | Performed by: NURSE PRACTITIONER

## 2022-07-12 PROCEDURE — 99213 OFFICE O/P EST LOW 20 MIN: CPT | Performed by: NURSE PRACTITIONER

## 2022-07-12 RX ORDER — NITROFURANTOIN 25; 75 MG/1; MG/1
100 CAPSULE ORAL 2 TIMES DAILY
Qty: 20 CAPSULE | Refills: 0 | Status: SHIPPED | OUTPATIENT
Start: 2022-07-12 | End: 2022-07-22

## 2022-07-12 ASSESSMENT — ENCOUNTER SYMPTOMS
ABDOMINAL PAIN: 1
RESPIRATORY NEGATIVE: 1
BACK PAIN: 1

## 2022-07-12 NOTE — PATIENT INSTRUCTIONS
Please read the healthy family handout that you were given and share it with your family. Please compare this printed medication list with your medications at home to be sure they are the same. If you have any medications that are different please contact us immediately at [phone]. Also review your allergies that we have listed, these may also include medications that you have not been able to tolerate, make sure everything listed is correct. If you have any allergies that are different please contact us immediately at [phone].

## 2022-07-12 NOTE — PROGRESS NOTES
CHIEF COMPLAINT  Chief Complaint   Patient presents with    Lower Back Pain    Abdominal Pain    Dysuria        HPI   Max Contreras is a 40 y.o. female who presents to the office complaining of lower back pain, abdominal pain, and pain with urination. Patient reports that those symptoms started Saturday evening. Patient reports that she did have some nausea and diarrhea with onset of symptoms but denies any now. Patient reports that she also had fatigue, chest congestion, and sore throat on Saturday but denies any currently. Patient reports COVID test x2 negative. No vaginal discharge or bleeding. Patient reports some vaginal dryness. Patient denies any recent changes in medications other than starting meloxicam approximately 2 weeks ago per orthopedics. No other complaints, modifying factors or associated symptoms. Nursing notes reviewed.    Past Medical History:   Diagnosis Date    Anxiety     Kidney stones     During pregnancy    Migraines     Pituitary adenoma ([facility] [address]     1/03 - Stable after 1 year     Past Surgical History:   Procedure Laterality Date    COLONOSCOPY      2011    CYST REMOVAL      knee    HYSTERECTOMY (CERVIX STATUS UNKNOWN)  12/17/2012     Family History   Problem Relation Age of Onset    Heart Disease Father         stents, MI, and now double bypass     Social History     Socioeconomic History    Marital status:      Spouse name: Not on file    Number of children: Not on file    Years of education: Not on file    Highest education level: Not on file   Occupational History    Not on file   Tobacco Use    Smoking status: Never Smoker    Smokeless tobacco: Never Used   Substance and Sexual Activity    Alcohol use: No    Drug use: No    Sexual activity: Yes     Partners: Male   Other Topics Concern    Not on file   Social History Narrative    Not on file     Social Determinants of Health     Financial Resource Strain:     Difficulty of Paying Living Expenses: Not on file   Food Insecurity:     Worried About Running Out of Food in the Last Year: Not on file    Anthony of Food in the Last Year: Not on file   Transportation Needs:     Lack of Transportation (Medical): Not on file    Lack of Transportation (Non-Medical): Not on file   Physical Activity: Unknown    Days of Exercise per Week: Patient refused    Minutes of Exercise per Session: Not on file   Stress:     Feeling of Stress : Not on file   Social Connections:     Frequency of Communication with Friends and Family: Not on file    Frequency of Social Gatherings with Friends and Family: Not on file    Attends Hoahaoism Services: Not on file    Active Member of [address] [place] [address] [employer] or Organizations: Not on file    Attends Club or Organization Meetings: Not on file    Marital Status: Not on file   Intimate Partner Violence: Not At Risk    Fear of Current or Ex-Partner: No    Emotionally Abused: No    Physically Abused: No    Sexually Abused: No   Housing Stability:     Unable to Pay for Housing in the Last Year: Not on file    Number of [place] in the Last Year: Not on file    Unstable Housing in the Last Year: Not on file     Current Outpatient Medications   Medication Sig Dispense Refill    nitrofurantoin, macrocrystal-monohydrate, (MACROBID) 100 MG capsule Take 1 capsule by mouth 2 times daily for 10 days 20 capsule 0    meloxicam (MOBIC) 15 MG tablet Take 1 tablet by mouth daily as needed for Pain 30 tablet 0    albuterol sulfate HFA (PROAIR HFA) 108 (90 BASE) MCG/ACT inhaler Inhale 2 puffs into the lungs every 4 hours as needed 1 Inhaler 3     No current facility-administered medications for this visit. Allergies   Allergen Reactions    Latex     Other      bandaid adhesive       REVIEW OF SYSTEMS  Review of Systems   Constitutional: Negative. HENT: Negative. Respiratory: Negative. Gastrointestinal: Positive for abdominal pain.    Genitourinary: Positive for dysuria and urgency. Vaginal dryness   Musculoskeletal: Positive for back pain. Skin: Negative. Neurological: Negative. Psychiatric/Behavioral: Negative. PHYSICAL EXAM  /78   Pulse 73   Temp 98.4 °F (36.9 °C) (Oral)   Wt 226 lb 4 oz (102.6 kg)   SpO2 99%   BMI 36.52 kg/m²   Physical Exam  Constitutional:       Appearance: Normal appearance. She is not toxic-appearing. HENT:      Head: Normocephalic. Right Ear: External ear normal.      Left Ear: External ear normal.      Nose: Nose normal.      Mouth/Throat:      Mouth: Mucous membranes are moist.      Pharynx: Oropharynx is clear. Eyes:      Extraocular Movements: Extraocular movements intact. Conjunctiva/sclera: Conjunctivae normal.   Cardiovascular:      Rate and Rhythm: Normal rate. Pulses: Normal pulses. Heart sounds: Normal heart sounds. Pulmonary:      Effort: Pulmonary effort is normal.      Breath sounds: Normal breath sounds. Abdominal:      General: There is no distension. Palpations: Abdomen is soft. Tenderness: There is no abdominal tenderness. There is no right CVA tenderness, left CVA tenderness or guarding. Musculoskeletal:         General: Normal range of motion. Cervical back: Normal range of motion and neck supple. Skin:     General: Skin is warm and dry. Capillary Refill: Capillary refill takes less than 2 seconds. Neurological:      Mental Status: She is alert and oriented to person, place, and time. ASSESSMENT/PLAN:   1. Dysuria  Patient presents today with complaints of pain with urination, low back pain abdominal pain. Patient reports symptoms started late Saturday night. Patient reports some initial nausea and diarrhea but denies any currently. Patient reports some vaginal dryness but denies any vaginal bleeding or abnormal discharge. Patient reports to home COVID test negative.   Patient reports that she was experiencing some fatigue, chest congestion and sore throat with initial symptoms but denies any currently. Urinalysis obtained in the office was negative for nitrites and leukocytes but did reveal small amount of blood. Patient reports concerns of UTI with history of acute cystitis. Patient reports that she has seen urologist in the past.  Urine culture pending. Based on symptoms I did recommend the patient increase water, alternate ibuprofen and Tylenol as needed and started on Macrobid. Patient aware that if symptoms were to worsen or progress she would need to notify our office or be reevaluated. Patient verbalized acknowledges with plan of care at this time. - POCT Urinalysis no Micro  - Culture, Urine  - nitrofurantoin, macrocrystal-monohydrate, (MACROBID) 100 MG capsule; Take 1 capsule by mouth 2 times daily for 10 days  Dispense: 20 capsule; Refill: 0    2. Hematuria, unspecified type  See above #1  - nitrofurantoin, macrocrystal-monohydrate, (MACROBID) 100 MG capsule; Take 1 capsule by mouth 2 times daily for 10 days  Dispense: 20 capsule; Refill: 0    3. Acute low back pain without sciatica, unspecified back pain laterality  See above #1    4. Abdominal pressure  See above #1         The note was completed using Dragon voice recognition transcription. Every effort was made to ensure accuracy; however, inadvertent  transcription errors may be present despite my best efforts to edit errors.     SHELBY Huntley - CNP

## 2022-07-14 LAB — URINE CULTURE, ROUTINE: NORMAL

## 2023-03-08 ENCOUNTER — OFFICE VISIT (OUTPATIENT)
Dept: FAMILY MEDICINE CLINIC | Age: 48
End: 2023-03-08
Payer: COMMERCIAL

## 2023-03-08 VITALS
BODY MASS INDEX: 36.25 KG/M2 | OXYGEN SATURATION: 97 % | WEIGHT: 224.6 LBS | TEMPERATURE: 98.4 F | SYSTOLIC BLOOD PRESSURE: 107 MMHG | HEART RATE: 77 BPM | DIASTOLIC BLOOD PRESSURE: 72 MMHG

## 2023-03-08 DIAGNOSIS — K21.00 GASTROESOPHAGEAL REFLUX DISEASE WITH ESOPHAGITIS WITHOUT HEMORRHAGE: Primary | ICD-10-CM

## 2023-03-08 PROCEDURE — 99214 OFFICE O/P EST MOD 30 MIN: CPT | Performed by: NURSE PRACTITIONER

## 2023-03-08 RX ORDER — OMEPRAZOLE 20 MG/1
20 CAPSULE, DELAYED RELEASE ORAL
Qty: 60 CAPSULE | Refills: 5 | Status: SHIPPED | OUTPATIENT
Start: 2023-03-08

## 2023-03-08 ASSESSMENT — PATIENT HEALTH QUESTIONNAIRE - PHQ9
6. FEELING BAD ABOUT YOURSELF - OR THAT YOU ARE A FAILURE OR HAVE LET YOURSELF OR YOUR FAMILY DOWN: 0
8. MOVING OR SPEAKING SO SLOWLY THAT OTHER PEOPLE COULD HAVE NOTICED. OR THE OPPOSITE, BEING SO FIGETY OR RESTLESS THAT YOU HAVE BEEN MOVING AROUND A LOT MORE THAN USUAL: 0
7. TROUBLE CONCENTRATING ON THINGS, SUCH AS READING THE NEWSPAPER OR WATCHING TELEVISION: 0
5. POOR APPETITE OR OVEREATING: 0
1. LITTLE INTEREST OR PLEASURE IN DOING THINGS: 0
SUM OF ALL RESPONSES TO PHQ QUESTIONS 1-9: 1
SUM OF ALL RESPONSES TO PHQ QUESTIONS 1-9: 1
10. IF YOU CHECKED OFF ANY PROBLEMS, HOW DIFFICULT HAVE THESE PROBLEMS MADE IT FOR YOU TO DO YOUR WORK, TAKE CARE OF THINGS AT HOME, OR GET ALONG WITH OTHER PEOPLE: 0
4. FEELING TIRED OR HAVING LITTLE ENERGY: 0
2. FEELING DOWN, DEPRESSED OR HOPELESS: 0
9. THOUGHTS THAT YOU WOULD BE BETTER OFF DEAD, OR OF HURTING YOURSELF: 0
SUM OF ALL RESPONSES TO PHQ9 QUESTIONS 1 & 2: 0
SUM OF ALL RESPONSES TO PHQ QUESTIONS 1-9: 1
3. TROUBLE FALLING OR STAYING ASLEEP: 1
SUM OF ALL RESPONSES TO PHQ QUESTIONS 1-9: 1

## 2023-03-08 ASSESSMENT — ENCOUNTER SYMPTOMS
EYES NEGATIVE: 1
SHORTNESS OF BREATH: 0
COUGH: 0
RESPIRATORY NEGATIVE: 1
DIARRHEA: 0
CHEST TIGHTNESS: 0
ABDOMINAL PAIN: 1
VOMITING: 0
NAUSEA: 1
ALLERGIC/IMMUNOLOGIC NEGATIVE: 1

## 2023-03-08 NOTE — PATIENT INSTRUCTIONS
Please read the healthy family handout that you were given and share it with your family. Please compare this printed medication list with your medications at home to be sure they are the same. If you have any medications that are different please contact us immediately at [phone]. Also review your allergies that we have listed, these may also include medications that you have not been able to tolerate, make sure everything listed is correct. If you have any allergies that are different please contact us immediately at [phone]. You may receive a survey in the mail or by email asking about your experience during your visit today. Please complete and return to us so we know how we are serving you.

## 2023-03-08 NOTE — PROGRESS NOTES
Subjective:      Patient ID: Matthew Hull is a 56 y.o. female. Chief Complaint   Patient presents with    Nausea    Abdominal Pain     diarrhea        Abdominal Pain  Associated symptoms include nausea. Pertinent negatives include no diarrhea, fever, headaches or vomiting. Patient presents today with c/o increased reflux symptoms over the past 2 weeks. Patient reports she has a history of GERD that has waxed and waned over the past several month and has worsened over the past 2 weeks. GERD  Paitent complains of heartburn. This has been associated with belching, difficulty swallowing, early satiety, fullness after meals, heartburn, epigastric pain, nausea, and upper abdominal discomfort. She denies  any other symptoms . Symptoms have been present for 2 weeks. She has had dysphagia for with solids however this alleviated by drinking a lot of fluids with her meal . She has not lost weight. She denies melena, hematochezia, hematemesis, and coffee ground emesis. Medical therapy in the past has included antacids with no improvement in symptoms. Patient has also tried protonix in the past however this was not effective. Review of Systems   Constitutional: Negative. Negative for activity change, appetite change, chills, fever and unexpected weight change. HENT: Negative. Negative for sneezing. Eyes: Negative. Respiratory: Negative. Negative for cough, chest tightness and shortness of breath. Cardiovascular: Negative. Negative for chest pain, palpitations and leg swelling. Gastrointestinal:  Positive for abdominal pain and nausea. Negative for diarrhea and vomiting. Bloating, belching, satiety   Endocrine: Negative. Genitourinary: Negative. Skin: Negative. Allergic/Immunologic: Negative. Neurological:  Negative for dizziness and headaches. Psychiatric/Behavioral: Negative.        Patient Active Problem List   Diagnosis    Chronic constipation    Anxiety    Vertigo Back pain    Chronic migraine without aura without status migrainosus, not intractable    Primary insomnia    Gastroesophageal reflux disease without esophagitis    Mild intermittent asthma without complication    Bipolar disorder, current episode mixed, mild (HCC)    Medication management    Closed fracture of right distal radius    Kidney stone    Vitamin D deficiency       No outpatient medications have been marked as taking for the 3/8/23 encounter (Office Visit) with SHELBY Messina CNP. Allergies   Allergen Reactions    Latex     Other      bandaid adhesive       Social History     Tobacco Use    Smoking status: Never    Smokeless tobacco: Never   Substance Use Topics    Alcohol use: No       Objective:   /72   Pulse 77   Temp 98.4 °F (36.9 °C) (Oral)   Wt 224 lb 9.6 oz (101.9 kg)   SpO2 97%   BMI 36.25 kg/m²     Physical Exam  Vitals and nursing note reviewed. Constitutional:       General: She is not in acute distress. Appearance: Normal appearance. She is well-developed and well-groomed. She is not ill-appearing or toxic-appearing. HENT:      Head: Normocephalic and atraumatic. Nose: Nose normal.      Mouth/Throat:      Lips: Pink. Mouth: Mucous membranes are moist.   Eyes:      Extraocular Movements: Extraocular movements intact. Conjunctiva/sclera: Conjunctivae normal.   Cardiovascular:      Rate and Rhythm: Normal rate and regular rhythm. Pulses: Normal pulses. Heart sounds: Normal heart sounds. Heart sounds not distant. No murmur heard. No friction rub. No gallop. Pulmonary:      Effort: Pulmonary effort is normal. No accessory muscle usage or respiratory distress. Breath sounds: Normal breath sounds. Abdominal:      General: Bowel sounds are normal. There is no distension. Palpations: Abdomen is soft. There is no hepatomegaly or splenomegaly. Tenderness: There is abdominal tenderness in the epigastric area. Musculoskeletal:      Cervical back: Normal range of motion and neck supple. Right lower leg: No edema. Left lower leg: No edema. Skin:     General: Skin is warm and dry. Findings: No rash. Neurological:      General: No focal deficit present. Mental Status: She is alert and oriented to person, place, and time. Mental status is at baseline. Psychiatric:         Attention and Perception: Attention normal.         Mood and Affect: Mood normal.         Speech: Speech normal.         Behavior: Behavior normal. Behavior is cooperative. Assessment/Plan:   1. Gastroesophageal reflux disease with esophagitis without hemorrhage  Patient presents today with complaints of frequent indigestion. Patient reports she has a personal history of reflux however has had worsening of symptoms over the past 2 weeks to the point that she feels nauseated constantly. Patient with complaints epigastric pain, frequent heartburn, bloating, belching and satiety. Patient reports she has made diet improvements and tried antiacids with no improvement in symptoms. On exam noted epigastric tenderness. Recommend omeprazole as below as patient has also tried Protonix in the past which has not been effective. Advised to eat 6 small meals rather than 3 large meals, remaining upright to 2 to 3 hours after eating, avoid food intolerances and to follow-up with any problems or concerns. I did encourage patient to make an appointment with her PCP as she has not seen him in over 2 years. Patient verbalized understanding and agreeable to plan. - omeprazole (PRILOSEC) 20 MG delayed release capsule; Take 1 capsule by mouth 2 times daily (before meals)  Dispense: 60 capsule;  Refill: 5

## 2023-03-30 DIAGNOSIS — K21.00 GASTROESOPHAGEAL REFLUX DISEASE WITH ESOPHAGITIS WITHOUT HEMORRHAGE: ICD-10-CM

## 2023-03-30 RX ORDER — OMEPRAZOLE 20 MG/1
20 CAPSULE, DELAYED RELEASE ORAL
Qty: 60 CAPSULE | Refills: 5 | Status: SHIPPED | OUTPATIENT
Start: 2023-03-30

## 2023-03-30 NOTE — TELEPHONE ENCOUNTER
Future appt scheduled 04/07/2023                Last appt 03/08/2023      Last Written 03/08/2023    omeprazole (PRILOSEC) 20 MG delayed release capsule  #60  5 RF

## 2023-04-04 ENCOUNTER — TELEPHONE (OUTPATIENT)
Dept: FAMILY MEDICINE CLINIC | Age: 48
End: 2023-04-04

## 2023-04-04 ENCOUNTER — NURSE ONLY (OUTPATIENT)
Dept: FAMILY MEDICINE CLINIC | Age: 48
End: 2023-04-04
Payer: COMMERCIAL

## 2023-04-04 DIAGNOSIS — R30.0 BURNING WITH URINATION: ICD-10-CM

## 2023-04-04 DIAGNOSIS — M54.50 ACUTE LOW BACK PAIN WITHOUT SCIATICA, UNSPECIFIED BACK PAIN LATERALITY: Primary | ICD-10-CM

## 2023-04-04 LAB
BILIRUBIN, POC: NEGATIVE
BLOOD URINE, POC: NORMAL
CLARITY, POC: NORMAL
COLOR, POC: YELLOW
GLUCOSE URINE, POC: NEGATIVE
KETONES, POC: NEGATIVE
LEUKOCYTE EST, POC: NEGATIVE
NITRITE, POC: NEGATIVE
PH, POC: 6.5
PROTEIN, POC: NEGATIVE
SPECIFIC GRAVITY, POC: 1.03
UROBILINOGEN, POC: 0.2

## 2023-04-04 PROCEDURE — 81003 URINALYSIS AUTO W/O SCOPE: CPT | Performed by: FAMILY MEDICINE

## 2023-04-04 SDOH — ECONOMIC STABILITY: FOOD INSECURITY: WITHIN THE PAST 12 MONTHS, THE FOOD YOU BOUGHT JUST DIDN'T LAST AND YOU DIDN'T HAVE MONEY TO GET MORE.: NEVER TRUE

## 2023-04-04 SDOH — ECONOMIC STABILITY: TRANSPORTATION INSECURITY
IN THE PAST 12 MONTHS, HAS LACK OF TRANSPORTATION KEPT YOU FROM MEETINGS, WORK, OR FROM GETTING THINGS NEEDED FOR DAILY LIVING?: NO

## 2023-04-04 SDOH — ECONOMIC STABILITY: HOUSING INSECURITY
IN THE LAST 12 MONTHS, WAS THERE A TIME WHEN YOU DID NOT HAVE A STEADY PLACE TO SLEEP OR SLEPT IN A SHELTER (INCLUDING NOW)?: NO

## 2023-04-04 SDOH — ECONOMIC STABILITY: FOOD INSECURITY: WITHIN THE PAST 12 MONTHS, YOU WORRIED THAT YOUR FOOD WOULD RUN OUT BEFORE YOU GOT MONEY TO BUY MORE.: NEVER TRUE

## 2023-04-04 SDOH — ECONOMIC STABILITY: INCOME INSECURITY: HOW HARD IS IT FOR YOU TO PAY FOR THE VERY BASICS LIKE FOOD, HOUSING, MEDICAL CARE, AND HEATING?: NOT HARD AT ALL

## 2023-04-04 NOTE — TELEPHONE ENCOUNTER
Patient c/o uti symptoms for the past several days. She has painful urination, blood in urine, low back pain and nausea. She does have appt to see you on Friday.   Do you want to have her come in for u/a and culture today on lab schedule

## 2023-04-06 LAB — BACTERIA UR CULT: NORMAL

## 2023-04-07 ENCOUNTER — OFFICE VISIT (OUTPATIENT)
Dept: FAMILY MEDICINE CLINIC | Age: 48
End: 2023-04-07
Payer: COMMERCIAL

## 2023-04-07 VITALS
OXYGEN SATURATION: 90 % | TEMPERATURE: 98.3 F | HEART RATE: 62 BPM | WEIGHT: 226 LBS | BODY MASS INDEX: 36.48 KG/M2 | DIASTOLIC BLOOD PRESSURE: 70 MMHG | SYSTOLIC BLOOD PRESSURE: 104 MMHG

## 2023-04-07 DIAGNOSIS — K58.2 IRRITABLE BOWEL SYNDROME WITH BOTH CONSTIPATION AND DIARRHEA: ICD-10-CM

## 2023-04-07 DIAGNOSIS — M25.561 CHRONIC PAIN OF BOTH KNEES: ICD-10-CM

## 2023-04-07 DIAGNOSIS — J45.20 MILD INTERMITTENT ASTHMA WITHOUT COMPLICATION: ICD-10-CM

## 2023-04-07 DIAGNOSIS — N30.10 INTERSTITIAL CYSTITIS: ICD-10-CM

## 2023-04-07 DIAGNOSIS — R10.84 GENERALIZED ABDOMINAL PAIN: Primary | ICD-10-CM

## 2023-04-07 DIAGNOSIS — E55.9 VITAMIN D DEFICIENCY: ICD-10-CM

## 2023-04-07 DIAGNOSIS — G89.29 CHRONIC PAIN OF BOTH KNEES: ICD-10-CM

## 2023-04-07 DIAGNOSIS — R31.9 HEMATURIA, UNSPECIFIED TYPE: ICD-10-CM

## 2023-04-07 DIAGNOSIS — G43.909 MIGRAINE WITHOUT STATUS MIGRAINOSUS, NOT INTRACTABLE, UNSPECIFIED MIGRAINE TYPE: ICD-10-CM

## 2023-04-07 DIAGNOSIS — R32 URINARY INCONTINENCE, UNSPECIFIED TYPE: ICD-10-CM

## 2023-04-07 DIAGNOSIS — R53.83 FATIGUE, UNSPECIFIED TYPE: ICD-10-CM

## 2023-04-07 DIAGNOSIS — R15.9 INCONTINENCE OF FECES, UNSPECIFIED FECAL INCONTINENCE TYPE: ICD-10-CM

## 2023-04-07 DIAGNOSIS — M25.562 CHRONIC PAIN OF BOTH KNEES: ICD-10-CM

## 2023-04-07 LAB
25(OH)D3 SERPL-MCNC: 21.2 NG/ML
ALBUMIN SERPL-MCNC: 4.4 G/DL (ref 3.4–5)
ALBUMIN/GLOB SERPL: 1.4 {RATIO} (ref 1.1–2.2)
ALP SERPL-CCNC: 59 U/L (ref 40–129)
ALT SERPL-CCNC: 16 U/L (ref 10–40)
ANION GAP SERPL CALCULATED.3IONS-SCNC: 13 MMOL/L (ref 3–16)
AST SERPL-CCNC: 15 U/L (ref 15–37)
BASOPHILS # BLD: 0 K/UL (ref 0–0.2)
BASOPHILS NFR BLD: 0.6 %
BILIRUB SERPL-MCNC: 0.4 MG/DL (ref 0–1)
BUN SERPL-MCNC: 13 MG/DL (ref 7–20)
CALCIUM SERPL-MCNC: 9.4 MG/DL (ref 8.3–10.6)
CHLORIDE SERPL-SCNC: 102 MMOL/L (ref 99–110)
CO2 SERPL-SCNC: 23 MMOL/L (ref 21–32)
CREAT SERPL-MCNC: 0.7 MG/DL (ref 0.6–1.1)
DEPRECATED RDW RBC AUTO: 13.5 % (ref 12.4–15.4)
EOSINOPHIL # BLD: 0.1 K/UL (ref 0–0.6)
EOSINOPHIL NFR BLD: 1 %
GFR SERPLBLD CREATININE-BSD FMLA CKD-EPI: >60 ML/MIN/{1.73_M2}
GLUCOSE SERPL-MCNC: 89 MG/DL (ref 70–99)
HCT VFR BLD AUTO: 40.1 % (ref 36–48)
HGB BLD-MCNC: 13.4 G/DL (ref 12–16)
LYMPHOCYTES # BLD: 2 K/UL (ref 1–5.1)
LYMPHOCYTES NFR BLD: 27.8 %
MCH RBC QN AUTO: 29.3 PG (ref 26–34)
MCHC RBC AUTO-ENTMCNC: 33.6 G/DL (ref 31–36)
MCV RBC AUTO: 87.4 FL (ref 80–100)
MONOCYTES # BLD: 0.4 K/UL (ref 0–1.3)
MONOCYTES NFR BLD: 6.1 %
NEUTROPHILS # BLD: 4.7 K/UL (ref 1.7–7.7)
NEUTROPHILS NFR BLD: 64.5 %
PLATELET # BLD AUTO: 302 K/UL (ref 135–450)
PMV BLD AUTO: 8.4 FL (ref 5–10.5)
POTASSIUM SERPL-SCNC: 4.2 MMOL/L (ref 3.5–5.1)
PROT SERPL-MCNC: 7.5 G/DL (ref 6.4–8.2)
RBC # BLD AUTO: 4.59 M/UL (ref 4–5.2)
SODIUM SERPL-SCNC: 138 MMOL/L (ref 136–145)
TSH SERPL DL<=0.005 MIU/L-ACNC: 1.2 UIU/ML (ref 0.27–4.2)
WBC # BLD AUTO: 7.2 K/UL (ref 4–11)

## 2023-04-07 PROCEDURE — 99215 OFFICE O/P EST HI 40 MIN: CPT | Performed by: FAMILY MEDICINE

## 2023-04-07 RX ORDER — BUDESONIDE AND FORMOTEROL FUMARATE DIHYDRATE 80; 4.5 UG/1; UG/1
2 AEROSOL RESPIRATORY (INHALATION) 2 TIMES DAILY
Qty: 10.2 G | Refills: 3 | Status: SHIPPED | OUTPATIENT
Start: 2023-04-07

## 2023-04-07 RX ORDER — DICYCLOMINE HYDROCHLORIDE 10 MG/1
10-20 CAPSULE ORAL 3 TIMES DAILY
Qty: 120 CAPSULE | Refills: 5 | Status: SHIPPED | OUTPATIENT
Start: 2023-04-07

## 2023-04-07 RX ORDER — ALBUTEROL SULFATE 90 UG/1
2 AEROSOL, METERED RESPIRATORY (INHALATION) EVERY 4 HOURS PRN
Qty: 1 EACH | Refills: 3 | Status: SHIPPED | OUTPATIENT
Start: 2023-04-07

## 2023-04-07 RX ORDER — SUMATRIPTAN 50 MG/1
50 TABLET, FILM COATED ORAL
Qty: 27 TABLET | Refills: 1 | Status: SHIPPED | OUTPATIENT
Start: 2023-04-07 | End: 2023-04-07

## 2023-04-07 NOTE — PATIENT INSTRUCTIONS
Please read the healthy family handout that you were given and share it with your family. Please compare this printed medication list with your medications at home to be sure they are the same. If you have any medications that are different please contact us immediately at [phone]. Also review your allergies that we have listed, these may also include medications that you have not been able to tolerate, make sure everything listed is correct. If you have any allergies that are different please contact us immediately at [phone]. You may receive a survey in the mail or by email asking about your experience during your visit today. Please complete and return to us so we know how we are serving you.

## 2023-04-07 NOTE — PROGRESS NOTES
Time spent for this visit today including before during and after face-to-face interaction is 46 minutes    Subjective:  Kristal Gibbons is here to discuss the following issues. She has multiple chronic issues. She has generalized abdominal pain which is severe and persistent resulting in intermittent diarrhea and constipation and she has had some bowel incontinence. She has had some bladder incontinence hematuria and has a history of interstitial cystitis. She has a history of kidney stones and has had some pain bilaterally in the flank area greatest on the right and this radiates around to the groin. She has had some vomiting. No fever sweats or chills. She has generalized fatigue on review of systems. She has asthma and has some shortness of breath occasionally with activity and in the past Breo and similar inhalers were very helpful. No discolored sputum fever sweats or chills. She has chronic pain in her knees bilaterally and this is severe and persistent and makes it difficult to go up and down steps and at times even difficult to get out of a chair. She requests Ortho referral.  She has a history of vitamin D deficiency she has migraine headaches and occasionally will have pulsatile headache pain with some nausea particularly when her blood pressures are elevated  Social History     Tobacco Use   Smoking Status Never   Smokeless Tobacco Never   Allergies:     Latex, Omeprazole, and Other    Objective:  /70   Pulse 62   Temp 98.3 °F (36.8 °C) (Oral)   Wt 226 lb (102.5 kg)   SpO2 90%   BMI 36.48 kg/m²    No acute distress, heart regular rate and rhythm without murmur, lungs clear to auscultation easy effort, abdomen nondistended, no clubbing or cyanosis diffuse generalized abdominal tenderness no mass no organomegaly noted joint redness swelling or warmth    Assessment:  1. Generalized abdominal pain    2. Fatigue, unspecified type    3. Mild intermittent asthma without complication    4.

## 2023-04-14 ENCOUNTER — APPOINTMENT (OUTPATIENT)
Dept: CT IMAGING | Age: 48
End: 2023-04-14
Payer: COMMERCIAL

## 2023-04-14 ENCOUNTER — HOSPITAL ENCOUNTER (EMERGENCY)
Age: 48
Discharge: HOME | End: 2023-04-15
Attending: STUDENT IN AN ORGANIZED HEALTH CARE EDUCATION/TRAINING PROGRAM
Payer: COMMERCIAL

## 2023-04-14 DIAGNOSIS — R11.2 NAUSEA AND VOMITING, UNSPECIFIED VOMITING TYPE: ICD-10-CM

## 2023-04-14 DIAGNOSIS — K82.8 ENLARGED GALLBLADDER: ICD-10-CM

## 2023-04-14 DIAGNOSIS — R10.11 RIGHT UPPER QUADRANT ABDOMINAL PAIN: Primary | ICD-10-CM

## 2023-04-14 LAB
ALBUMIN SERPL-MCNC: 4.4 G/DL (ref 3.4–5)
ALBUMIN/GLOB SERPL: 1.3 {RATIO} (ref 1.1–2.2)
ALP SERPL-CCNC: 68 U/L (ref 40–129)
ALT SERPL-CCNC: 11 U/L (ref 10–40)
AMORPH SED URNS QL MICRO: NORMAL /HPF
ANION GAP SERPL CALCULATED.3IONS-SCNC: 13 MMOL/L (ref 3–16)
AST SERPL-CCNC: 16 U/L (ref 15–37)
BASOPHILS # BLD: 0.1 K/UL (ref 0–0.2)
BASOPHILS NFR BLD: 0.4 %
BILIRUB SERPL-MCNC: 0.4 MG/DL (ref 0–1)
BILIRUB UR QL STRIP.AUTO: NEGATIVE
BUN SERPL-MCNC: 13 MG/DL (ref 7–20)
CALCIUM SERPL-MCNC: 9.6 MG/DL (ref 8.3–10.6)
CHLORIDE SERPL-SCNC: 105 MMOL/L (ref 99–110)
CLARITY UR: ABNORMAL
CO2 SERPL-SCNC: 21 MMOL/L (ref 21–32)
COLOR UR: YELLOW
CREAT SERPL-MCNC: 0.7 MG/DL (ref 0.6–1.1)
DEPRECATED RDW RBC AUTO: 13.4 % (ref 12.4–15.4)
EOSINOPHIL # BLD: 0 K/UL (ref 0–0.6)
EOSINOPHIL NFR BLD: 0.1 %
GFR SERPLBLD CREATININE-BSD FMLA CKD-EPI: >60 ML/MIN/{1.73_M2}
GLUCOSE SERPL-MCNC: 127 MG/DL (ref 70–99)
GLUCOSE UR STRIP.AUTO-MCNC: NEGATIVE MG/DL
HCG UR QL: NEGATIVE
HCT VFR BLD AUTO: 39.7 % (ref 36–48)
HGB BLD-MCNC: 13.5 G/DL (ref 12–16)
HGB UR QL STRIP.AUTO: ABNORMAL
KETONES UR STRIP.AUTO-MCNC: NEGATIVE MG/DL
LACTATE BLDV-SCNC: 2 MMOL/L (ref 0.4–1.9)
LEUKOCYTE ESTERASE UR QL STRIP.AUTO: NEGATIVE
LIPASE SERPL-CCNC: 29 U/L (ref 13–60)
LYMPHOCYTES # BLD: 1.4 K/UL (ref 1–5.1)
LYMPHOCYTES NFR BLD: 10 %
MCH RBC QN AUTO: 28.8 PG (ref 26–34)
MCHC RBC AUTO-ENTMCNC: 33.9 G/DL (ref 31–36)
MCV RBC AUTO: 84.8 FL (ref 80–100)
MONOCYTES # BLD: 0.5 K/UL (ref 0–1.3)
MONOCYTES NFR BLD: 3.3 %
NEUTROPHILS # BLD: 12 K/UL (ref 1.7–7.7)
NEUTROPHILS NFR BLD: 86.2 %
NITRITE UR QL STRIP.AUTO: NEGATIVE
PH UR STRIP.AUTO: 8.5 [PH] (ref 5–8)
PLATELET # BLD AUTO: 333 K/UL (ref 135–450)
PMV BLD AUTO: 8.2 FL (ref 5–10.5)
POTASSIUM SERPL-SCNC: 4 MMOL/L (ref 3.5–5.1)
PROT SERPL-MCNC: 7.8 G/DL (ref 6.4–8.2)
PROT UR STRIP.AUTO-MCNC: NEGATIVE MG/DL
RBC # BLD AUTO: 4.68 M/UL (ref 4–5.2)
RBC #/AREA URNS HPF: NORMAL /HPF (ref 0–4)
SODIUM SERPL-SCNC: 139 MMOL/L (ref 136–145)
SP GR UR STRIP.AUTO: 1.01 (ref 1–1.03)
TROPONIN T SERPL-MCNC: <0.01 NG/ML
UA COMPLETE W REFLEX CULTURE PNL UR: ABNORMAL
UA DIPSTICK W REFLEX MICRO PNL UR: YES
URN SPEC COLLECT METH UR: ABNORMAL
UROBILINOGEN UR STRIP-ACNC: 0.2 E.U./DL
WBC # BLD AUTO: 13.9 K/UL (ref 4–11)
WBC #/AREA URNS HPF: NORMAL /HPF (ref 0–5)

## 2023-04-14 PROCEDURE — 99285 EMERGENCY DEPT VISIT HI MDM: CPT

## 2023-04-14 PROCEDURE — 6360000002 HC RX W HCPCS: Performed by: PHYSICIAN ASSISTANT

## 2023-04-14 PROCEDURE — 81001 URINALYSIS AUTO W/SCOPE: CPT

## 2023-04-14 PROCEDURE — 93005 ELECTROCARDIOGRAM TRACING: CPT | Performed by: PHYSICIAN ASSISTANT

## 2023-04-14 PROCEDURE — 2580000003 HC RX 258: Performed by: PHYSICIAN ASSISTANT

## 2023-04-14 PROCEDURE — 85025 COMPLETE CBC W/AUTO DIFF WBC: CPT

## 2023-04-14 PROCEDURE — 36415 COLL VENOUS BLD VENIPUNCTURE: CPT

## 2023-04-14 PROCEDURE — 6360000004 HC RX CONTRAST MEDICATION: Performed by: PHYSICIAN ASSISTANT

## 2023-04-14 PROCEDURE — 96376 TX/PRO/DX INJ SAME DRUG ADON: CPT

## 2023-04-14 PROCEDURE — 96374 THER/PROPH/DIAG INJ IV PUSH: CPT

## 2023-04-14 PROCEDURE — 83605 ASSAY OF LACTIC ACID: CPT

## 2023-04-14 PROCEDURE — 84484 ASSAY OF TROPONIN QUANT: CPT

## 2023-04-14 PROCEDURE — 74177 CT ABD & PELVIS W/CONTRAST: CPT

## 2023-04-14 PROCEDURE — 96375 TX/PRO/DX INJ NEW DRUG ADDON: CPT

## 2023-04-14 PROCEDURE — 84703 CHORIONIC GONADOTROPIN ASSAY: CPT

## 2023-04-14 PROCEDURE — 83690 ASSAY OF LIPASE: CPT

## 2023-04-14 PROCEDURE — 80053 COMPREHEN METABOLIC PANEL: CPT

## 2023-04-14 RX ORDER — 0.9 % SODIUM CHLORIDE 0.9 %
1000 INTRAVENOUS SOLUTION INTRAVENOUS ONCE
Status: COMPLETED | OUTPATIENT
Start: 2023-04-14 | End: 2023-04-14

## 2023-04-14 RX ORDER — ONDANSETRON 2 MG/ML
4 INJECTION INTRAMUSCULAR; INTRAVENOUS ONCE
Status: COMPLETED | OUTPATIENT
Start: 2023-04-14 | End: 2023-04-14

## 2023-04-14 RX ORDER — MORPHINE SULFATE 4 MG/ML
4 INJECTION, SOLUTION INTRAMUSCULAR; INTRAVENOUS ONCE
Status: COMPLETED | OUTPATIENT
Start: 2023-04-14 | End: 2023-04-14

## 2023-04-14 RX ADMIN — ONDANSETRON 4 MG: 2 INJECTION INTRAMUSCULAR; INTRAVENOUS at 22:19

## 2023-04-14 RX ADMIN — IOPAMIDOL 75 ML: 755 INJECTION, SOLUTION INTRAVENOUS at 23:03

## 2023-04-14 RX ADMIN — MORPHINE SULFATE 4 MG: 4 INJECTION, SOLUTION INTRAMUSCULAR; INTRAVENOUS at 22:19

## 2023-04-14 RX ADMIN — HYDROMORPHONE HYDROCHLORIDE 1 MG: 1 INJECTION, SOLUTION INTRAMUSCULAR; INTRAVENOUS; SUBCUTANEOUS at 22:56

## 2023-04-14 RX ADMIN — SODIUM CHLORIDE 1000 ML: 9 INJECTION, SOLUTION INTRAVENOUS at 22:20

## 2023-04-14 RX ADMIN — ONDANSETRON 4 MG: 2 INJECTION INTRAMUSCULAR; INTRAVENOUS at 23:40

## 2023-04-14 ASSESSMENT — PAIN DESCRIPTION - ORIENTATION
ORIENTATION: RIGHT
ORIENTATION: RIGHT;MID

## 2023-04-14 ASSESSMENT — PAIN DESCRIPTION - PAIN TYPE: TYPE: ACUTE PAIN

## 2023-04-14 ASSESSMENT — PAIN - FUNCTIONAL ASSESSMENT: PAIN_FUNCTIONAL_ASSESSMENT: 0-10

## 2023-04-14 ASSESSMENT — PAIN SCALES - GENERAL
PAINLEVEL_OUTOF10: 4
PAINLEVEL_OUTOF10: 10
PAINLEVEL_OUTOF10: 10

## 2023-04-14 ASSESSMENT — PAIN DESCRIPTION - LOCATION
LOCATION: ABDOMEN
LOCATION: ABDOMEN

## 2023-04-14 ASSESSMENT — PAIN DESCRIPTION - FREQUENCY: FREQUENCY: CONTINUOUS

## 2023-04-14 ASSESSMENT — PAIN DESCRIPTION - DESCRIPTORS: DESCRIPTORS: ACHING;SHARP;PRESSURE

## 2023-04-15 ENCOUNTER — APPOINTMENT (OUTPATIENT)
Dept: ULTRASOUND IMAGING | Age: 48
End: 2023-04-15
Payer: COMMERCIAL

## 2023-04-15 VITALS
DIASTOLIC BLOOD PRESSURE: 60 MMHG | OXYGEN SATURATION: 98 % | WEIGHT: 225 LBS | BODY MASS INDEX: 36.32 KG/M2 | TEMPERATURE: 97.2 F | HEART RATE: 71 BPM | SYSTOLIC BLOOD PRESSURE: 110 MMHG | RESPIRATION RATE: 16 BRPM

## 2023-04-15 LAB
EKG ATRIAL RATE: 58 BPM
EKG DIAGNOSIS: NORMAL
EKG P AXIS: 36 DEGREES
EKG P-R INTERVAL: 138 MS
EKG Q-T INTERVAL: 456 MS
EKG QRS DURATION: 100 MS
EKG QTC CALCULATION (BAZETT): 447 MS
EKG R AXIS: -6 DEGREES
EKG T AXIS: 8 DEGREES
EKG VENTRICULAR RATE: 58 BPM
LACTATE BLDV-SCNC: 0.8 MMOL/L (ref 0.4–1.9)

## 2023-04-15 PROCEDURE — 36415 COLL VENOUS BLD VENIPUNCTURE: CPT

## 2023-04-15 PROCEDURE — 96372 THER/PROPH/DIAG INJ SC/IM: CPT

## 2023-04-15 PROCEDURE — 76705 ECHO EXAM OF ABDOMEN: CPT

## 2023-04-15 PROCEDURE — 93010 ELECTROCARDIOGRAM REPORT: CPT | Performed by: INTERNAL MEDICINE

## 2023-04-15 PROCEDURE — 83605 ASSAY OF LACTIC ACID: CPT

## 2023-04-15 PROCEDURE — 6360000002 HC RX W HCPCS: Performed by: STUDENT IN AN ORGANIZED HEALTH CARE EDUCATION/TRAINING PROGRAM

## 2023-04-15 RX ORDER — DICYCLOMINE HYDROCHLORIDE 10 MG/1
10 CAPSULE ORAL EVERY 6 HOURS PRN
Qty: 20 CAPSULE | Refills: 0 | Status: SHIPPED | OUTPATIENT
Start: 2023-04-15 | End: 2023-04-20

## 2023-04-15 RX ORDER — PROMETHAZINE HYDROCHLORIDE 25 MG/1
25 SUPPOSITORY RECTAL EVERY 6 HOURS PRN
Qty: 10 SUPPOSITORY | Refills: 0 | Status: SHIPPED | OUTPATIENT
Start: 2023-04-15 | End: 2023-04-21 | Stop reason: ALTCHOICE

## 2023-04-15 RX ORDER — PROMETHAZINE HYDROCHLORIDE 25 MG/ML
12.5 INJECTION, SOLUTION INTRAMUSCULAR; INTRAVENOUS ONCE
Status: COMPLETED | OUTPATIENT
Start: 2023-04-15 | End: 2023-04-15

## 2023-04-15 RX ORDER — ONDANSETRON 4 MG/1
4 TABLET, ORALLY DISINTEGRATING ORAL EVERY 8 HOURS PRN
Qty: 20 TABLET | Refills: 0 | Status: SHIPPED | OUTPATIENT
Start: 2023-04-15 | End: 2023-04-22

## 2023-04-15 RX ADMIN — PROMETHAZINE HYDROCHLORIDE 12.5 MG: 25 INJECTION INTRAMUSCULAR; INTRAVENOUS at 05:04

## 2023-04-15 NOTE — ED NOTES
0111 - Perfect Serve sent to General Surgery. Dr. Katerina Wells is on call.      Olga Lidia Pabon  04/15/23 0111    0122 - Dr. Katerina Wells returned the page and spoke directly to Dr. Farhat Sauceda  04/15/23 0130       Olga Lidia Pabon  04/15/23 0131

## 2023-04-15 NOTE — ED NOTES
Pt. Given crackers and water for PO challenge. Pt. Reports she is unable to eat/drink without feeling nausea.       Emily Painter RN  04/15/23 5993

## 2023-04-15 NOTE — ED NOTES
12 Lead EKG complete and given to Dr. Ritika Castro for review. Pt. Linsey Hogan on cardiac monitor at this time. Pt. With no needs, family at bedside.      Mukesh Bonilla RN  04/14/23 3977

## 2023-04-15 NOTE — DISCHARGE INSTRUCTIONS
Home Care Instructions: Abdominal Pain        Many things may cause abdominal pain. Your ER visit might not show the exact reason you are having pain. In some cases, additional time is needed to determine if the cause is serious. Therefore you may be told to go home and watch for any changes or worsening in your condition. Before that, we may not know if you need more testing, or if hospitalization or surgery is necessary. If its not something serious, the pain may go away without treatment or get better with simple things like avoiding certain foods or medications. In the ER, your doctor asks you questions, examines you and in some cases, may order tests. These help doctors decide if the pain is from something serious. Tests are not always done and may not provide a definite answer. There can still be a problem, even with normal test results. Abdominal pain may be caused by something serious (like appendicitis), which is not obvious right away. Because of this, another checkup is needed to make sure you are OK. It is VERY IMPORTANT to follow up for a repeat exam, especially if you have any symptoms that are not going away or are getting worse. .      It is important that you follow all of the instructions below. HOME CARE INSTRUCTIONS:   DO NOT take laxatives unless directed by your doctor. Avoid the use of alcohol   Take pain medicine only as directed by your doctor. Only take over-the-counter or prescription medicine as directed by your doctor. Try a clear liquid diet (broth, tea, jello, water) for the next 12-24 hours. Slowly move to  a bland diet as tolerated. Do not eat greasy, fatty or spicy foods. Once you start getting better, go back to a normal, healthy diet, slowly over a few days. RETURN TO THE EMERGENCY ROOM IMMEDIATELY IF:   The pain does not go away or gets worse. You have a fever. You keep throwing up and cannot keep anything down. You pass bloody or black stools.    You

## 2023-04-17 ENCOUNTER — TELEPHONE (OUTPATIENT)
Dept: FAMILY MEDICINE CLINIC | Age: 48
End: 2023-04-17

## 2023-04-17 DIAGNOSIS — K82.9 GALLBLADDER DISORDER: Primary | ICD-10-CM

## 2023-04-19 ENCOUNTER — INITIAL CONSULT (OUTPATIENT)
Dept: SURGERY | Age: 48
End: 2023-04-19

## 2023-04-19 VITALS
SYSTOLIC BLOOD PRESSURE: 106 MMHG | HEART RATE: 77 BPM | BODY MASS INDEX: 41.22 KG/M2 | DIASTOLIC BLOOD PRESSURE: 62 MMHG | HEIGHT: 66 IN | WEIGHT: 256.5 LBS

## 2023-04-19 DIAGNOSIS — K81.1 CHRONIC CHOLECYSTITIS: Primary | ICD-10-CM

## 2023-04-19 RX ORDER — SODIUM CHLORIDE 9 MG/ML
INJECTION, SOLUTION INTRAVENOUS PRN
OUTPATIENT
Start: 2023-04-19

## 2023-04-19 RX ORDER — HEPARIN SODIUM 5000 [USP'U]/ML
5000 INJECTION, SOLUTION INTRAVENOUS; SUBCUTANEOUS ONCE
OUTPATIENT
Start: 2023-04-19

## 2023-04-19 RX ORDER — SODIUM CHLORIDE 0.9 % (FLUSH) 0.9 %
5-40 SYRINGE (ML) INJECTION PRN
OUTPATIENT
Start: 2023-04-19

## 2023-04-19 RX ORDER — SODIUM CHLORIDE 0.9 % (FLUSH) 0.9 %
5-40 SYRINGE (ML) INJECTION EVERY 12 HOURS SCHEDULED
OUTPATIENT
Start: 2023-04-19

## 2023-04-20 ENCOUNTER — ANESTHESIA EVENT (OUTPATIENT)
Dept: OPERATING ROOM | Age: 48
End: 2023-04-20
Payer: COMMERCIAL

## 2023-04-20 DIAGNOSIS — K81.1 CHRONIC CHOLECYSTITIS: ICD-10-CM

## 2023-04-20 NOTE — ED PROVIDER NOTES
were completed with a voice recognition program.  Efforts were made to edit the dictations but occasionally words are mis-transcribed.)    Gretchen Ahumada, [address] (electronically signed)            Gretchen Ahumada, [address]  04/15/23 0031
Resp:  18 15 16   Temp:       TempSrc:       SpO2:  98% 97% 98%   Weight:           CRITICAL CARE TIME     I personally spent a total of *** minutes of critical care time in obtaining history, performing a physical exam, bedside monitoring of interventions, collecting and interpreting tests and discussion with consultants but excluding time spent performing procedures, treating other patients and teaching time. FINAL IMPRESSION      1. Right upper quadrant abdominal pain    2. Enlarged gallbladder    3. Nausea and vomiting, unspecified vomiting type          DISPOSITION/PLAN   Disposition: DISPOSITION Decision To Discharge 04/15/2023 04:39:54 AM    Condition: {Condition:35507::\"good\",\"stable\",\"fair\",\"critical\"}     DISCHARGE MEDICATIONS:  Patient was given scripts for the following medications.  I counseled patient how to take these medications:  Discharge Medication List as of 4/15/2023  5:01 AM        START taking these medications    Details   dicyclomine (BENTYL) 10 MG capsule Take 1 capsule by mouth every 6 hours as needed (Bowel spasms), Disp-20 capsule, R-0Normal      ondansetron (ZOFRAN-ODT) 4 MG disintegrating tablet Place 1 tablet under the tongue every 8 hours as needed for Nausea or Vomiting May Sub regular tablet (non-ODT) if insurance does not cover ODT., Disp-20 tablet, R-0Normal      promethazine (PHENERGAN) 25 MG suppository Place 1 suppository rectally every 6 hours as needed for Nausea, Disp-10 suppository, R-0Normal             DISCONTINUED MEDICATIONS:  Discharge Medication List as of 4/15/2023  5:01 AM          FOLLOW UP:  Mark Hightower MD  [facility] [address] Altagracia Garcia [address] [phone]    Schedule an appointment as soon as possible for a visit       Serina Srivastava MD  [address] Akil Muse Black [address] [place] [address]   [phone]    Schedule an appointment as soon as possible for a visit   For a recheck in 3   days    [facility] ED  [address] [address]

## 2023-04-25 ENCOUNTER — ANESTHESIA (OUTPATIENT)
Dept: OPERATING ROOM | Age: 48
End: 2023-04-25
Payer: COMMERCIAL

## 2023-04-25 ENCOUNTER — HOSPITAL ENCOUNTER (OUTPATIENT)
Age: 48
Setting detail: OUTPATIENT SURGERY
Discharge: HOME | End: 2023-04-25
Attending: SURGERY | Admitting: SURGERY
Payer: COMMERCIAL

## 2023-04-25 VITALS
SYSTOLIC BLOOD PRESSURE: 117 MMHG | HEIGHT: 66 IN | TEMPERATURE: 96.8 F | HEART RATE: 60 BPM | DIASTOLIC BLOOD PRESSURE: 75 MMHG | BODY MASS INDEX: 36.16 KG/M2 | WEIGHT: 225 LBS | OXYGEN SATURATION: 97 % | RESPIRATION RATE: 17 BRPM

## 2023-04-25 DIAGNOSIS — K81.1 CHRONIC CHOLECYSTITIS: ICD-10-CM

## 2023-04-25 LAB
ALBUMIN SERPL-MCNC: 4.2 G/DL (ref 3.4–5)
ALP SERPL-CCNC: 57 U/L (ref 40–129)
ALT SERPL-CCNC: 21 U/L (ref 10–40)
AST SERPL-CCNC: 24 U/L (ref 15–37)
BILIRUB DIRECT SERPL-MCNC: <0.2 MG/DL (ref 0–0.3)
BILIRUB INDIRECT SERPL-MCNC: NORMAL MG/DL (ref 0–1)
BILIRUB SERPL-MCNC: 0.4 MG/DL (ref 0–1)
PROT SERPL-MCNC: 6.9 G/DL (ref 6.4–8.2)

## 2023-04-25 PROCEDURE — 2580000003 HC RX 258: Performed by: SURGERY

## 2023-04-25 PROCEDURE — 36415 COLL VENOUS BLD VENIPUNCTURE: CPT

## 2023-04-25 PROCEDURE — 2580000003 HC RX 258: Performed by: ANESTHESIOLOGY

## 2023-04-25 PROCEDURE — 3700000001 HC ADD 15 MINUTES (ANESTHESIA): Performed by: SURGERY

## 2023-04-25 PROCEDURE — 88304 TISSUE EXAM BY PATHOLOGIST: CPT

## 2023-04-25 PROCEDURE — 3700000000 HC ANESTHESIA ATTENDED CARE: Performed by: SURGERY

## 2023-04-25 PROCEDURE — 3600000014 HC SURGERY LEVEL 4 ADDTL 15MIN: Performed by: SURGERY

## 2023-04-25 PROCEDURE — 47562 LAPAROSCOPIC CHOLECYSTECTOMY: CPT | Performed by: SURGERY

## 2023-04-25 PROCEDURE — 6370000000 HC RX 637 (ALT 250 FOR IP): Performed by: ANESTHESIOLOGY

## 2023-04-25 PROCEDURE — 2500000003 HC RX 250 WO HCPCS: Performed by: NURSE ANESTHETIST, CERTIFIED REGISTERED

## 2023-04-25 PROCEDURE — 80076 HEPATIC FUNCTION PANEL: CPT

## 2023-04-25 PROCEDURE — 2709999900 HC NON-CHARGEABLE SUPPLY: Performed by: SURGERY

## 2023-04-25 PROCEDURE — 7100000010 HC PHASE II RECOVERY - FIRST 15 MIN: Performed by: SURGERY

## 2023-04-25 PROCEDURE — 3600000004 HC SURGERY LEVEL 4 BASE: Performed by: SURGERY

## 2023-04-25 PROCEDURE — 7100000001 HC PACU RECOVERY - ADDTL 15 MIN: Performed by: SURGERY

## 2023-04-25 PROCEDURE — 6360000002 HC RX W HCPCS: Performed by: SURGERY

## 2023-04-25 PROCEDURE — 6360000002 HC RX W HCPCS: Performed by: NURSE ANESTHETIST, CERTIFIED REGISTERED

## 2023-04-25 PROCEDURE — 2500000003 HC RX 250 WO HCPCS: Performed by: ANESTHESIOLOGY

## 2023-04-25 PROCEDURE — 2500000003 HC RX 250 WO HCPCS: Performed by: SURGERY

## 2023-04-25 PROCEDURE — 7100000000 HC PACU RECOVERY - FIRST 15 MIN: Performed by: SURGERY

## 2023-04-25 RX ORDER — SODIUM CHLORIDE 9 MG/ML
INJECTION, SOLUTION INTRAVENOUS PRN
Status: DISCONTINUED | OUTPATIENT
Start: 2023-04-25 | End: 2023-04-25 | Stop reason: HOSPADM

## 2023-04-25 RX ORDER — SODIUM CHLORIDE, SODIUM LACTATE, POTASSIUM CHLORIDE, CALCIUM CHLORIDE 600; 310; 30; 20 MG/100ML; MG/100ML; MG/100ML; MG/100ML
INJECTION, SOLUTION INTRAVENOUS CONTINUOUS
Status: DISCONTINUED | OUTPATIENT
Start: 2023-04-25 | End: 2023-04-25 | Stop reason: HOSPADM

## 2023-04-25 RX ORDER — DIPHENHYDRAMINE HYDROCHLORIDE 50 MG/ML
12.5 INJECTION INTRAMUSCULAR; INTRAVENOUS
Status: DISCONTINUED | OUTPATIENT
Start: 2023-04-25 | End: 2023-04-25 | Stop reason: HOSPADM

## 2023-04-25 RX ORDER — ONDANSETRON 2 MG/ML
INJECTION INTRAMUSCULAR; INTRAVENOUS PRN
Status: DISCONTINUED | OUTPATIENT
Start: 2023-04-25 | End: 2023-04-25 | Stop reason: SDUPTHER

## 2023-04-25 RX ORDER — HEPARIN SODIUM 5000 [USP'U]/ML
5000 INJECTION, SOLUTION INTRAVENOUS; SUBCUTANEOUS ONCE
Status: COMPLETED | OUTPATIENT
Start: 2023-04-25 | End: 2023-04-25

## 2023-04-25 RX ORDER — SODIUM CHLORIDE 0.9 % (FLUSH) 0.9 %
5-40 SYRINGE (ML) INJECTION PRN
Status: DISCONTINUED | OUTPATIENT
Start: 2023-04-25 | End: 2023-04-25 | Stop reason: HOSPADM

## 2023-04-25 RX ORDER — LABETALOL HYDROCHLORIDE 5 MG/ML
5 INJECTION, SOLUTION INTRAVENOUS EVERY 10 MIN PRN
Status: DISCONTINUED | OUTPATIENT
Start: 2023-04-25 | End: 2023-04-25 | Stop reason: HOSPADM

## 2023-04-25 RX ORDER — DEXAMETHASONE SODIUM PHOSPHATE 4 MG/ML
INJECTION, SOLUTION INTRA-ARTICULAR; INTRALESIONAL; INTRAMUSCULAR; INTRAVENOUS; SOFT TISSUE PRN
Status: DISCONTINUED | OUTPATIENT
Start: 2023-04-25 | End: 2023-04-25 | Stop reason: SDUPTHER

## 2023-04-25 RX ORDER — KETOROLAC TROMETHAMINE 30 MG/ML
INJECTION, SOLUTION INTRAMUSCULAR; INTRAVENOUS PRN
Status: DISCONTINUED | OUTPATIENT
Start: 2023-04-25 | End: 2023-04-25 | Stop reason: SDUPTHER

## 2023-04-25 RX ORDER — LIDOCAINE HYDROCHLORIDE 20 MG/ML
INJECTION, SOLUTION INFILTRATION; PERINEURAL PRN
Status: DISCONTINUED | OUTPATIENT
Start: 2023-04-25 | End: 2023-04-25 | Stop reason: SDUPTHER

## 2023-04-25 RX ORDER — SODIUM CHLORIDE 0.9 % (FLUSH) 0.9 %
5-40 SYRINGE (ML) INJECTION EVERY 12 HOURS SCHEDULED
Status: DISCONTINUED | OUTPATIENT
Start: 2023-04-25 | End: 2023-04-25 | Stop reason: HOSPADM

## 2023-04-25 RX ORDER — PROPOFOL 10 MG/ML
INJECTION, EMULSION INTRAVENOUS PRN
Status: DISCONTINUED | OUTPATIENT
Start: 2023-04-25 | End: 2023-04-25 | Stop reason: SDUPTHER

## 2023-04-25 RX ORDER — FENTANYL CITRATE 50 UG/ML
INJECTION, SOLUTION INTRAMUSCULAR; INTRAVENOUS PRN
Status: DISCONTINUED | OUTPATIENT
Start: 2023-04-25 | End: 2023-04-25 | Stop reason: SDUPTHER

## 2023-04-25 RX ORDER — BUPIVACAINE HYDROCHLORIDE 5 MG/ML
INJECTION, SOLUTION EPIDURAL; INTRACAUDAL PRN
Status: DISCONTINUED | OUTPATIENT
Start: 2023-04-25 | End: 2023-04-25 | Stop reason: ALTCHOICE

## 2023-04-25 RX ORDER — OXYCODONE HYDROCHLORIDE 5 MG/1
10 TABLET ORAL PRN
Status: COMPLETED | OUTPATIENT
Start: 2023-04-25 | End: 2023-04-25

## 2023-04-25 RX ORDER — SODIUM CHLORIDE, SODIUM LACTATE, POTASSIUM CHLORIDE, AND CALCIUM CHLORIDE .6; .31; .03; .02 G/100ML; G/100ML; G/100ML; G/100ML
IRRIGANT IRRIGATION PRN
Status: DISCONTINUED | OUTPATIENT
Start: 2023-04-25 | End: 2023-04-25 | Stop reason: ALTCHOICE

## 2023-04-25 RX ORDER — MIDAZOLAM HYDROCHLORIDE 1 MG/ML
INJECTION INTRAMUSCULAR; INTRAVENOUS PRN
Status: DISCONTINUED | OUTPATIENT
Start: 2023-04-25 | End: 2023-04-25 | Stop reason: SDUPTHER

## 2023-04-25 RX ORDER — ROCURONIUM BROMIDE 10 MG/ML
INJECTION, SOLUTION INTRAVENOUS PRN
Status: DISCONTINUED | OUTPATIENT
Start: 2023-04-25 | End: 2023-04-25 | Stop reason: SDUPTHER

## 2023-04-25 RX ORDER — MEPERIDINE HYDROCHLORIDE 25 MG/ML
12.5 INJECTION INTRAMUSCULAR; INTRAVENOUS; SUBCUTANEOUS EVERY 5 MIN PRN
Status: DISCONTINUED | OUTPATIENT
Start: 2023-04-25 | End: 2023-04-25 | Stop reason: HOSPADM

## 2023-04-25 RX ORDER — OXYCODONE HYDROCHLORIDE 5 MG/1
5 TABLET ORAL EVERY 6 HOURS PRN
Qty: 24 TABLET | Refills: 0 | Status: SHIPPED | OUTPATIENT
Start: 2023-04-25 | End: 2023-05-02

## 2023-04-25 RX ORDER — OXYCODONE HYDROCHLORIDE 5 MG/1
5 TABLET ORAL PRN
Status: COMPLETED | OUTPATIENT
Start: 2023-04-25 | End: 2023-04-25

## 2023-04-25 RX ORDER — ONDANSETRON 2 MG/ML
4 INJECTION INTRAMUSCULAR; INTRAVENOUS
Status: DISCONTINUED | OUTPATIENT
Start: 2023-04-25 | End: 2023-04-25 | Stop reason: HOSPADM

## 2023-04-25 RX ORDER — FAMOTIDINE 10 MG/ML
20 INJECTION, SOLUTION INTRAVENOUS ONCE
Status: COMPLETED | OUTPATIENT
Start: 2023-04-25 | End: 2023-04-25

## 2023-04-25 RX ADMIN — PROPOFOL 200 MG: 10 INJECTION, EMULSION INTRAVENOUS at 07:34

## 2023-04-25 RX ADMIN — DEXAMETHASONE SODIUM PHOSPHATE 8 MG: 4 INJECTION, SOLUTION INTRAMUSCULAR; INTRAVENOUS at 07:40

## 2023-04-25 RX ADMIN — FENTANYL CITRATE 50 MCG: 50 INJECTION INTRAMUSCULAR; INTRAVENOUS at 08:11

## 2023-04-25 RX ADMIN — SUGAMMADEX 200 MG: 100 INJECTION, SOLUTION INTRAVENOUS at 08:01

## 2023-04-25 RX ADMIN — CEFAZOLIN 2000 MG: 2 INJECTION, POWDER, FOR SOLUTION INTRAMUSCULAR; INTRAVENOUS at 07:31

## 2023-04-25 RX ADMIN — FENTANYL CITRATE 100 MCG: 50 INJECTION INTRAMUSCULAR; INTRAVENOUS at 07:34

## 2023-04-25 RX ADMIN — FAMOTIDINE 20 MG: 10 INJECTION INTRAVENOUS at 07:11

## 2023-04-25 RX ADMIN — OXYCODONE HYDROCHLORIDE 5 MG: 5 TABLET ORAL at 08:53

## 2023-04-25 RX ADMIN — MIDAZOLAM 2 MG: 1 INJECTION INTRAMUSCULAR; INTRAVENOUS at 07:31

## 2023-04-25 RX ADMIN — FENTANYL CITRATE 50 MCG: 50 INJECTION INTRAMUSCULAR; INTRAVENOUS at 08:08

## 2023-04-25 RX ADMIN — ROCURONIUM BROMIDE 50 MG: 10 INJECTION, SOLUTION INTRAVENOUS at 07:34

## 2023-04-25 RX ADMIN — SODIUM CHLORIDE, POTASSIUM CHLORIDE, SODIUM LACTATE AND CALCIUM CHLORIDE: 600; 310; 30; 20 INJECTION, SOLUTION INTRAVENOUS at 07:12

## 2023-04-25 RX ADMIN — ONDANSETRON HYDROCHLORIDE 8 MG: 2 INJECTION, SOLUTION INTRAMUSCULAR; INTRAVENOUS at 07:40

## 2023-04-25 RX ADMIN — HEPARIN SODIUM 5000 UNITS: 5000 INJECTION INTRAVENOUS; SUBCUTANEOUS at 07:11

## 2023-04-25 RX ADMIN — LIDOCAINE HYDROCHLORIDE 60 MG: 20 INJECTION, SOLUTION INFILTRATION; PERINEURAL at 07:34

## 2023-04-25 RX ADMIN — KETOROLAC TROMETHAMINE 30 MG: 30 INJECTION, SOLUTION INTRAMUSCULAR at 08:02

## 2023-04-25 ASSESSMENT — PAIN DESCRIPTION - LOCATION
LOCATION: OTHER (COMMENT)
LOCATION: ABDOMEN

## 2023-04-25 ASSESSMENT — PAIN DESCRIPTION - FREQUENCY: FREQUENCY: OTHER (COMMENT)

## 2023-04-25 ASSESSMENT — PAIN DESCRIPTION - DESCRIPTORS
DESCRIPTORS: OTHER (COMMENT)
DESCRIPTORS: ACHING;PRESSURE

## 2023-04-25 ASSESSMENT — PAIN - FUNCTIONAL ASSESSMENT: PAIN_FUNCTIONAL_ASSESSMENT: NONE - DENIES PAIN

## 2023-04-25 ASSESSMENT — PAIN SCALES - GENERAL
PAINLEVEL_OUTOF10: 0
PAINLEVEL_OUTOF10: 6

## 2023-04-25 ASSESSMENT — PAIN DESCRIPTION - ORIENTATION
ORIENTATION: OTHER (COMMENT)
ORIENTATION: RIGHT;MID

## 2023-04-25 ASSESSMENT — PAIN DESCRIPTION - ONSET: ONSET: OTHER (COMMENT)

## 2023-04-25 ASSESSMENT — PAIN DESCRIPTION - PAIN TYPE: TYPE: OTHER (COMMENT)

## 2023-04-25 NOTE — DISCHARGE INSTRUCTIONS
[facility] SURGERY [facility] AND [place]    Eusebio Alanis. Ivette Dean M.D. [address] [facility] [address] [place]                [address] Savanna Hurtado M.D. [address] [facility]         [place], [address] Shanon Culver M.D                         [phone]        [facility] De Cadroza M.D. [address] [facility]       POST-OPERATIVE INSTRUCTIONS FOR GALLBLADDER SURGERY    Call the office to schedule your post-operative appointment with your surgeon for two (2) weeks. You will have either white steri-strips and a water occlusive dressing or surgical glue closing your incisions. If you have clear bandages over your incisions, you may remove them in 2 days. Leave the steri-stips in place. These will peel away in 7-10 days. You may shower in 2 days after removing your dressing. Wash incisions gently, and pat them dry. Do not rub your incisions. General guidelines for activity:   Avoid strenuous activity or lifting anything heavier than 15 pounds. It is OK to be up  walking around, and walking up and down stairs. Do what is comfortable: stop and rest when you feel tired. Drink plenty of fluids and stay on a bland diet for 2-3 days after surgery. Do NOT drive while taking your narcotic pain medicine. You may resume driving when you feel capable of responding to an urgent situation if needed and not taking prescription pain medication. Watch for signs of infection:  Excessive warmth or bright redness around your incisions  Leakage of bloody or cloudy fluid from you incisions  Fever over 100.5  During the laparoscopic procedure that you had, gas is pumped into the abdominal cavity. You may feel abdominal, shoulder, or rib pain for a few days due to this gas.     You

## 2023-04-25 NOTE — H&P
[facility] [place] GENERAL SURGERY      The H&P was reviewed, the patient was examined, and no change has occurred in the patient's condition since the H&P was completed. The indications for the procedure were reviewed, and any questions were answered. I updated the progress note from 4/19/2023 which is the H&P.      Vitals:    04/25/23 0654   BP: 116/72   Pulse: 67   Resp: 16   Temp: 97 °F (36.1 °C)   SpO2: 98%

## 2023-04-25 NOTE — ANESTHESIA POSTPROCEDURE EVALUATION
Department of Anesthesiology  Postprocedure Note    Patient: Pranav Dent  MRN: [id number]  YOB: 1975  Date of evaluation: 4/25/2023      Procedure Summary     Date: 04/25/23 Room / Location: [place] [address] / [facility]    Anesthesia Start: 9336 Anesthesia Stop: 9103    Procedure: LAPAROSCOPIC CHOLECYSTECTOMY, (Abdomen) Diagnosis:       Chronic cholecystitis      (Chronic cholecystitis [K81.1])    Surgeons: Supriya Babin MD Responsible Provider: Kendal Webb MD    Anesthesia Type: general ASA Status: 3          Anesthesia Type: No value filed.     Larisa Phase I: Larisa Score: 10    Larisa Phase II: Larisa Score: 10      Anesthesia Post Evaluation    Comments: Postoperative Anesthesia Note    Name:    Pranav Dent  MRN:      [id number]    Patient Vitals in the past 12 hrs:  04/25/23 0900, Pulse:60, Resp:17, SpO2:97 %  04/25/23 0855, BP:117/75, Temp:96.8 °F (36 °C), Temp src:Infrared, Pulse:56, Resp:12, SpO2:100 %  04/25/23 0850, BP:123/68, Pulse:62, Resp:17, SpO2:100 %  04/25/23 0845, BP:114/78, Pulse:60, Resp:14, SpO2:97 %  04/25/23 0840, BP:125/84, Pulse:66, Resp:22, SpO2:95 %  04/25/23 0835, BP:131/81, Pulse:66, Resp:13, SpO2:97 %  04/25/23 0830, BP:138/77, Pulse:72, Resp:13, SpO2:96 %  04/25/23 0825, BP:134/84, Pulse:63, Resp:14, SpO2:92 %  04/25/23 0820, Pulse:67, Resp:16, SpO2:92 %  04/25/23 0815, Pulse:70, Resp:15, SpO2:93 %  04/25/23 0814, BP:129/77, Temp:96.8 °F (36 °C), Temp src:Infrared, Pulse:74, Resp:15, SpO2:93 %  04/25/23 0654, BP:116/72, Temp:97 °F (36.1 °C), Temp src:Infrared, Pulse:67, Resp:16, SpO2:98 %, Height:5' 6\" (1.676 m), Weight:225 lb (102.1 kg)     LABS:    CBC  Lab Results       Component                Value               Date/Time                  WBC                      13.9 (H)            04/14/2023 10:20 PM        HGB                      13.5                04/14/2023 10:20 PM        HCT                      39.7

## 2023-04-25 NOTE — PROGRESS NOTES
Gave discharge instructions to patient and  of patient, both expressed understanding. Patient a/o x4, pain is related to air in abdomen. Patient able to eat and drink without nausea. All questions answered.

## 2023-04-25 NOTE — BRIEF OP NOTE
Brief Postoperative Note      Patient: David Brice  YOB: 1975  MRN: [id number]    Date of Procedure: 4/25/2023    Pre-Op Diagnosis Codes:     * Chronic cholecystitis [K81.1]    Post-Op Diagnosis: Same       Procedure(s):  LAPAROSCOPIC CHOLECYSTECTOMY,    Surgeon(s):  Tra Flores MD    Assistant:  Surgical Assistant: Deanne Vyas    Anesthesia: General    Estimated Blood Loss (mL): Minimal    Complications: None    Specimens:   ID Type Source Tests Collected by Time Destination   A : galbblaberenice and [facility] Tissue Tissue SURGICAL PATHOLOGY Tra Flores MD 4/25/2023 4412        Implants:  * No implants in log *      Drains: * No LDAs found *    Findings: As above      Electronically signed by Meagan Matias MD on 4/25/2023 at 8:08 AM

## 2023-04-27 NOTE — OP NOTE
[address]                 [address] [facility] [address]                                OPERATIVE REPORT    PATIENT NAME: Freida Meadows                   :        1975  MED REC NO:   [id number]                          ROOM:  ACCOUNT NO:   [de-identified]                           ADMIT DATE: 2023  PROVIDER:     Zulay Ontiveros MD    DATE OF PROCEDURE:  2023    PREOPERATIVE DIAGNOSIS:  Chronic cholecystitis. POSTOPERATIVE DIAGNOSIS:  Chronic cholecystitis. OPERATION PERFORMED:  Laparoscopic cholecystectomy. SURGEON:  Zulay Ontiveros MD    ANESTHESIA:  General.    COMPLICATIONS:  None. ESTIMATED BLOOD LOSS:  Less than 50 mL. INDICATIONS FOR THE OPERATION:  A 45-year-old female with recurring  episodes of epigastric and right upper quadrant pain. Imaging showed  sludge in the gallbladder. Symptoms sounded typical biliary. I  recommended operative intervention. The risks and benefits of operative  intervention were explained. The patient understood them, accepted  them, and elected to proceed. DESCRIPTION OF OPERATION:  The patient was brought to the operating  room. General anesthesia was induced. The patient was prepped and  draped in usual surgical sterile fashion. Vertical supraumbilical  incision was made. Veress needle was inserted. Pneumoperitoneum was  established. Disposable 5-mm trocar was passed through the incision. Laparoscope was inserted. Under direct vision, an 11-mm port was placed  in the epigastrium and two 5-mm ports in the right upper quadrant. We  had adequate visualization and retraction. I identified the cystic duct  as it tapered into the gallbladder. Three clips were placed away from  the gallbladder, one clip next to gallbladder, and the cystic duct was  divided. Cystic artery had two clips placed proximal, one clip distal,  and it was divided.   Posterior branch of the artery was clipped

## 2023-05-22 ENCOUNTER — COMMUNITY OUTREACH (OUTPATIENT)
Dept: FAMILY MEDICINE CLINIC | Age: 48
End: 2023-05-22

## 2023-09-20 ENCOUNTER — OFFICE VISIT (OUTPATIENT)
Dept: FAMILY MEDICINE CLINIC | Age: 48
End: 2023-09-20
Payer: COMMERCIAL

## 2023-09-20 VITALS
DIASTOLIC BLOOD PRESSURE: 63 MMHG | TEMPERATURE: 98.4 F | OXYGEN SATURATION: 94 % | BODY MASS INDEX: 34.54 KG/M2 | WEIGHT: 214 LBS | SYSTOLIC BLOOD PRESSURE: 97 MMHG | HEART RATE: 66 BPM

## 2023-09-20 DIAGNOSIS — F51.01 PRIMARY INSOMNIA: ICD-10-CM

## 2023-09-20 DIAGNOSIS — K58.2 IRRITABLE BOWEL SYNDROME WITH BOTH CONSTIPATION AND DIARRHEA: ICD-10-CM

## 2023-09-20 DIAGNOSIS — E66.09 CLASS 1 OBESITY DUE TO EXCESS CALORIES WITHOUT SERIOUS COMORBIDITY IN ADULT, UNSPECIFIED BMI: ICD-10-CM

## 2023-09-20 DIAGNOSIS — F31.61 BIPOLAR DISORDER, CURRENT EPISODE MIXED, MILD (HCC): Primary | ICD-10-CM

## 2023-09-20 PROCEDURE — 99214 OFFICE O/P EST MOD 30 MIN: CPT | Performed by: FAMILY MEDICINE

## 2023-09-20 RX ORDER — NALTREXONE HYDROCHLORIDE 50 MG/1
50 TABLET, FILM COATED ORAL DAILY
Qty: 30 TABLET | Refills: 5 | Status: SHIPPED | OUTPATIENT
Start: 2023-09-20

## 2023-09-20 RX ORDER — BUPROPION HYDROCHLORIDE 300 MG/1
300 TABLET ORAL EVERY MORNING
Qty: 30 TABLET | Refills: 5 | Status: SHIPPED | OUTPATIENT
Start: 2023-09-20

## 2023-09-20 NOTE — PATIENT INSTRUCTIONS
Please read the healthy family handout that you were given and share it with your family. Please compare this printed medication list with your medications at home to be sure they are the same. If you have any medications that are different please contact us immediately at [phone]. Also review your allergies that we have listed, these may also include medications that you have not been able to tolerate, make sure everything listed is correct. If you have any allergies that are different please contact us immediately at [phone]. You may receive a survey in the mail or by email asking about your experience during your visit today. Please complete and return to us so we know how we are serving you.

## 2023-09-20 NOTE — PROGRESS NOTES
system. Proper technique and careful oversight were used to increase transcription accuracy but inadvertent errors may be present.

## 2023-09-25 ENCOUNTER — TELEPHONE (OUTPATIENT)
Dept: FAMILY MEDICINE CLINIC | Age: 48
End: 2023-09-25

## 2023-09-25 NOTE — TELEPHONE ENCOUNTER
Was seen on wed she had you look at there ear said there was fluid behind it and was told to try allergy med, she has been doing that but over the weekend, it has got worse, more pain, feels like she has motion sickness,headache, has been using migraine medication and anti nausea medication with no relief, she uses [facility], please advise.

## 2023-09-26 RX ORDER — AZITHROMYCIN 250 MG/1
TABLET, FILM COATED ORAL
Qty: 6 TABLET | Refills: 0 | Status: SHIPPED | OUTPATIENT
Start: 2023-09-26

## 2024-03-14 ENCOUNTER — OFFICE VISIT (OUTPATIENT)
Dept: FAMILY MEDICINE CLINIC | Age: 49
End: 2024-03-14
Payer: COMMERCIAL

## 2024-03-14 VITALS
HEART RATE: 79 BPM | RESPIRATION RATE: 16 BRPM | WEIGHT: 202.6 LBS | HEIGHT: 66 IN | SYSTOLIC BLOOD PRESSURE: 124 MMHG | TEMPERATURE: 97.9 F | OXYGEN SATURATION: 97 % | DIASTOLIC BLOOD PRESSURE: 80 MMHG | BODY MASS INDEX: 32.56 KG/M2

## 2024-03-14 DIAGNOSIS — N64.4 PAIN OF BOTH BREASTS: Primary | ICD-10-CM

## 2024-03-14 PROCEDURE — 99213 OFFICE O/P EST LOW 20 MIN: CPT | Performed by: NURSE PRACTITIONER

## 2024-03-14 ASSESSMENT — ENCOUNTER SYMPTOMS
EYES NEGATIVE: 1
GASTROINTESTINAL NEGATIVE: 1
RESPIRATORY NEGATIVE: 1
CHEST TIGHTNESS: 0
COUGH: 0
ALLERGIC/IMMUNOLOGIC NEGATIVE: 1
SHORTNESS OF BREATH: 0

## 2024-03-14 ASSESSMENT — PATIENT HEALTH QUESTIONNAIRE - PHQ9
SUM OF ALL RESPONSES TO PHQ QUESTIONS 1-9: 1
SUM OF ALL RESPONSES TO PHQ9 QUESTIONS 1 & 2: 0
SUM OF ALL RESPONSES TO PHQ QUESTIONS 1-9: 1
6. FEELING BAD ABOUT YOURSELF - OR THAT YOU ARE A FAILURE OR HAVE LET YOURSELF OR YOUR FAMILY DOWN: 0
2. FEELING DOWN, DEPRESSED OR HOPELESS: 0
SUM OF ALL RESPONSES TO PHQ QUESTIONS 1-9: 1
4. FEELING TIRED OR HAVING LITTLE ENERGY: 1
7. TROUBLE CONCENTRATING ON THINGS, SUCH AS READING THE NEWSPAPER OR WATCHING TELEVISION: 0
1. LITTLE INTEREST OR PLEASURE IN DOING THINGS: 0
5. POOR APPETITE OR OVEREATING: 0
8. MOVING OR SPEAKING SO SLOWLY THAT OTHER PEOPLE COULD HAVE NOTICED. OR THE OPPOSITE, BEING SO FIGETY OR RESTLESS THAT YOU HAVE BEEN MOVING AROUND A LOT MORE THAN USUAL: 0
3. TROUBLE FALLING OR STAYING ASLEEP: 0
10. IF YOU CHECKED OFF ANY PROBLEMS, HOW DIFFICULT HAVE THESE PROBLEMS MADE IT FOR YOU TO DO YOUR WORK, TAKE CARE OF THINGS AT HOME, OR GET ALONG WITH OTHER PEOPLE: 0
SUM OF ALL RESPONSES TO PHQ QUESTIONS 1-9: 1
9. THOUGHTS THAT YOU WOULD BE BETTER OFF DEAD, OR OF HURTING YOURSELF: 0

## 2024-03-14 NOTE — PROGRESS NOTES
Subjective:      Patient ID: Gina Vyas is a 48 y.o. female.    Chief Complaint   Patient presents with    Breast Problem        HPI      Patient presents today with concerns of breast pain/tenderness. Patient reports both are sore however the left > right. Patient denies any skin changes, no redness or dimpling. Patient states she has always been able to massage her breast and get some drainage from nipples - thick white substance with no discoloration or odor.     Review of Systems   Constitutional: Negative.  Negative for activity change, appetite change, chills, fever and unexpected weight change.   HENT: Negative.  Negative for sneezing.    Eyes: Negative.    Respiratory: Negative.  Negative for cough, chest tightness and shortness of breath.    Cardiovascular: Negative.  Negative for chest pain, palpitations and leg swelling.   Gastrointestinal: Negative.    Endocrine: Negative.    Genitourinary: Negative.    Skin: Negative.    Allergic/Immunologic: Negative.    Neurological:  Negative for dizziness and headaches.   Psychiatric/Behavioral: Negative.         Patient Active Problem List   Diagnosis    Chronic constipation    Anxiety    Vertigo    Back pain    Chronic migraine without aura without status migrainosus, not intractable    Primary insomnia    Gastroesophageal reflux disease without esophagitis    Mild intermittent asthma without complication    Bipolar disorder, current episode mixed, mild (HCC)    Medication management    Closed fracture of right distal radius    Kidney stone    Vitamin D deficiency    Migraine without status migrainosus, not intractable    Chronic pain of both knees    Irritable bowel syndrome with both constipation and diarrhea    Interstitial cystitis    Chronic cholecystitis       No outpatient medications have been marked as taking for the 3/14/24 encounter (Appointment) with Izzy Ang APRN - CNP.       Allergies   Allergen Reactions    Latex     Omeprazole

## 2024-03-15 ENCOUNTER — TELEPHONE (OUTPATIENT)
Dept: FAMILY MEDICINE CLINIC | Age: 49
End: 2024-03-15

## 2024-03-15 NOTE — TELEPHONE ENCOUNTER
Pt called and stated that when trying to get her 3D mammogram scheduled, it would be a 3 week wait. An was wondering if she could go to [facility] in [place], [place] to get it done?  If so I will fax the order to them.

## 2024-03-18 NOTE — TELEPHONE ENCOUNTER
Patient calling to check on status of this. Was informed provider not in office today, we will call back as soon as possible.

## 2024-04-10 RX ORDER — BUPROPION HYDROCHLORIDE 300 MG/1
300 TABLET ORAL EVERY MORNING
Qty: 30 TABLET | Refills: 5 | Status: SHIPPED | OUTPATIENT
Start: 2024-04-10

## 2024-04-10 NOTE — TELEPHONE ENCOUNTER
[phone] (Mobile) Called and scheduled next follow up appointment       Future appt scheduled 07/24/2024                    Last appt 09/20/2023      Last Written 09/20/2023    buPROPion (WELLBUTRIN XL) 300 MG extended release tablet   #30  5 RF

## 2024-04-17 RX ORDER — SUMATRIPTAN 50 MG/1
50 TABLET, FILM COATED ORAL
Qty: 27 TABLET | Refills: 1 | Status: SHIPPED | OUTPATIENT
Start: 2024-04-17 | End: 2024-04-17

## 2024-04-19 RX ORDER — NALTREXONE HYDROCHLORIDE 50 MG/1
50 TABLET, FILM COATED ORAL DAILY
Qty: 30 TABLET | Refills: 5 | Status: SHIPPED | OUTPATIENT
Start: 2024-04-19

## 2024-04-19 NOTE — TELEPHONE ENCOUNTER
Controlled Substance Monitoring:    Acute and Chronic Pain Monitoring:   RX Monitoring Periodic Controlled Substance Monitoring   4/19/2024   6:08 PM No signs of potential drug abuse or diversion identified.

## 2024-08-12 RX ORDER — BUPROPION HYDROCHLORIDE 300 MG/1
300 TABLET ORAL EVERY MORNING
Qty: 30 TABLET | Refills: 0 | OUTPATIENT
Start: 2024-08-12

## 2025-01-13 RX ORDER — BUPROPION HYDROCHLORIDE 300 MG/1
300 TABLET ORAL EVERY MORNING
Qty: 90 TABLET | Refills: 1 | OUTPATIENT
Start: 2025-01-13

## 2025-02-13 ENCOUNTER — TRANSCRIBE ORDERS (OUTPATIENT)
Dept: ADMINISTRATIVE | Age: 50
End: 2025-02-13

## 2025-02-13 DIAGNOSIS — N64.4 BREAST TENDERNESS: Primary | ICD-10-CM

## 2025-04-14 ENCOUNTER — HOSPITAL ENCOUNTER (OUTPATIENT)
Dept: MAMMOGRAPHY | Age: 50
Discharge: HOME | End: 2025-04-14
Payer: COMMERCIAL

## 2025-04-14 ENCOUNTER — HOSPITAL ENCOUNTER (OUTPATIENT)
Dept: ULTRASOUND IMAGING | Age: 50
End: 2025-04-14
Payer: COMMERCIAL

## 2025-04-14 DIAGNOSIS — N64.4 BREAST TENDERNESS: ICD-10-CM

## 2025-04-14 PROCEDURE — G0279 TOMOSYNTHESIS, MAMMO: HCPCS

## (undated) DEVICE — TROCAR: Brand: KII FIOS FIRST ENTRY

## (undated) DEVICE — SOLUTION IV IRRIG 500ML 0.9% SODIUM CHL 2F7123

## (undated) DEVICE — APPLICATOR MEDICATED 26 CC SOLUTION HI LT ORNG CHLORAPREP

## (undated) DEVICE — GOWN,AURORA,NONREINF,RAGLAN,XXL,STERILE: Brand: MEDLINE

## (undated) DEVICE — TUBING INSUFFLATION ISO CONNECTOR DISPOSABLE

## (undated) DEVICE — ELECTRODE PT RET AD L9FT HI MOIST COND ADH HYDRGEL CORDED

## (undated) DEVICE — STANDARD HYPODERMIC NEEDLE,POLYPROPYLENE HUB: Brand: MONOJECT

## (undated) DEVICE — DRAPE,ABDOMINAL,MAJOR,STERILE: Brand: MEDLINE

## (undated) DEVICE — DISPOSABLE LAPAROSCOPIC CLIP APPLIER WITH 20 CLIPS.: Brand: EPIX® UNIVERSAL CLIP APPLIER

## (undated) DEVICE — GAUZE SPONGES,8 PLY: Brand: CURITY

## (undated) DEVICE — 3M™ STERI-STRIP™ COMPOUND BENZOIN TINCTURE 40 BAGS/CARTON 4 CARTONS/CASE C1544: Brand: 3M™ STERI-STRIP™

## (undated) DEVICE — SYRINGE MED 10ML LUERLOCK TIP W/O SFTY DISP

## (undated) DEVICE — TISSUE RETRIEVAL SYSTEM: Brand: INZII RETRIEVAL SYSTEM

## (undated) DEVICE — KIT,ANTI FOG,W/SPONGE & FLUID,SOFT PACK: Brand: MEDLINE

## (undated) DEVICE — SUTURE SZ 0 27IN 5/8 CIR UR-6  TAPER PT VIOLET ABSRB VICRYL J603H

## (undated) DEVICE — TROCAR: Brand: KII® SLEEVE

## (undated) DEVICE — SUTURE VCRL SZ 4-0 L18IN ABSRB UD L19MM PS-2 3/8 CIR PRIM J496H

## (undated) DEVICE — MAJOR SET UP PK

## (undated) DEVICE — PUMP SUC IRR TBNG L10FT W/ HNDPC ASSEMB STRYKEFLOW 2

## (undated) DEVICE — INSUFFLATION NEEDLE TO ESTABLISH PNEUMOPERITONEUM.: Brand: INSUFFLATION NEEDLE

## (undated) DEVICE — CORD ES L10FT MPLR LAP

## (undated) DEVICE — 3M™ TEGADERM™ TRANSPARENT FILM DRESSING FRAME STYLE, 1624W, 2-3/8 IN X 2-3/4 IN (6 CM X 7 CM), 100/CT 4CT/CASE: Brand: 3M™ TEGADERM™

## (undated) DEVICE — TUBING, SUCTION, 3/16" X 10', STRAIGHT: Brand: MEDLINE

## (undated) DEVICE — INTENDED FOR TISSUE SEPARATION, AND OTHER PROCEDURES THAT REQUIRE A SHARP SURGICAL BLADE TO PUNCTURE OR CUT.: Brand: BARD-PARKER ® STAINLESS STEEL BLADES

## (undated) DEVICE — GLOVE ORANGE PI 8 1/2   MSG9085

## (undated) DEVICE — YANKAUER,BULB TIP,W/O VENT,RIGID,STERILE: Brand: MEDLINE

## (undated) DEVICE — CIRCUIT ANES L72IN 3L BACT AND VIR FLTR EL CONN SGL LIMB